# Patient Record
Sex: FEMALE | Race: WHITE
[De-identification: names, ages, dates, MRNs, and addresses within clinical notes are randomized per-mention and may not be internally consistent; named-entity substitution may affect disease eponyms.]

---

## 2023-01-30 ENCOUNTER — OFFICE VISIT (OUTPATIENT)
Dept: FAMILY MEDICINE CLINIC | Facility: CLINIC | Age: 43
End: 2023-01-30

## 2023-01-30 VITALS
HEART RATE: 63 BPM | HEIGHT: 62 IN | BODY MASS INDEX: 27.79 KG/M2 | SYSTOLIC BLOOD PRESSURE: 102 MMHG | DIASTOLIC BLOOD PRESSURE: 64 MMHG | OXYGEN SATURATION: 98 % | WEIGHT: 151 LBS

## 2023-01-30 DIAGNOSIS — Z11.3 SCREENING EXAMINATION FOR STD (SEXUALLY TRANSMITTED DISEASE): Primary | ICD-10-CM

## 2023-01-30 DIAGNOSIS — F32.A ANXIETY AND DEPRESSION: ICD-10-CM

## 2023-01-30 DIAGNOSIS — Z12.4 PAP SMEAR FOR CERVICAL CANCER SCREENING: ICD-10-CM

## 2023-01-30 DIAGNOSIS — K59.00 CONSTIPATION, UNSPECIFIED CONSTIPATION TYPE: ICD-10-CM

## 2023-01-30 DIAGNOSIS — F43.10 POST TRAUMATIC STRESS DISORDER (PTSD): ICD-10-CM

## 2023-01-30 DIAGNOSIS — F51.04 PSYCHOPHYSIOLOGICAL INSOMNIA: ICD-10-CM

## 2023-01-30 DIAGNOSIS — Z00.00 ENCNTR FOR GENERAL ADULT MEDICAL EXAM W/O ABNORMAL FINDINGS: ICD-10-CM

## 2023-01-30 DIAGNOSIS — F41.9 ANXIETY AND DEPRESSION: ICD-10-CM

## 2023-01-30 DIAGNOSIS — Z11.59 NEED FOR HEPATITIS C SCREENING TEST: ICD-10-CM

## 2023-01-30 LAB
ALBUMIN SERPL-MCNC: 3.8 G/DL (ref 3.5–5)
ALBUMIN/GLOB SERPL: 1 (ref 0.4–1.6)
ALP SERPL-CCNC: 51 U/L (ref 50–136)
ALT SERPL-CCNC: 20 U/L (ref 12–65)
ANION GAP SERPL CALC-SCNC: 9 MMOL/L (ref 2–11)
AST SERPL-CCNC: 10 U/L (ref 15–37)
BASOPHILS # BLD: 0 K/UL (ref 0–0.2)
BASOPHILS NFR BLD: 0 % (ref 0–2)
BILIRUB SERPL-MCNC: 0.2 MG/DL (ref 0.2–1.1)
BUN SERPL-MCNC: 14 MG/DL (ref 6–23)
CALCIUM SERPL-MCNC: 9.6 MG/DL (ref 8.3–10.4)
CHLORIDE SERPL-SCNC: 105 MMOL/L (ref 101–110)
CHOLEST SERPL-MCNC: 187 MG/DL
CO2 SERPL-SCNC: 26 MMOL/L (ref 21–32)
CREAT SERPL-MCNC: 0.8 MG/DL (ref 0.6–1)
DIFFERENTIAL METHOD BLD: NORMAL
EOSINOPHIL # BLD: 0.1 K/UL (ref 0–0.8)
EOSINOPHIL NFR BLD: 1 % (ref 0.5–7.8)
ERYTHROCYTE [DISTWIDTH] IN BLOOD BY AUTOMATED COUNT: 12.1 % (ref 11.9–14.6)
GLOBULIN SER CALC-MCNC: 4 G/DL (ref 2.8–4.5)
GLUCOSE SERPL-MCNC: 78 MG/DL (ref 65–100)
HCT VFR BLD AUTO: 40.8 % (ref 35.8–46.3)
HCV AB SER QL: NONREACTIVE
HDLC SERPL-MCNC: 63 MG/DL (ref 40–60)
HDLC SERPL: 3
HGB BLD-MCNC: 13 G/DL (ref 11.7–15.4)
HIV 1+2 AB+HIV1 P24 AG SERPL QL IA: NONREACTIVE
HIV 1/2 RESULT COMMENT: NORMAL
IMM GRANULOCYTES # BLD AUTO: 0 K/UL (ref 0–0.5)
IMM GRANULOCYTES NFR BLD AUTO: 0 % (ref 0–5)
LDLC SERPL CALC-MCNC: 102 MG/DL
LYMPHOCYTES # BLD: 2.8 K/UL (ref 0.5–4.6)
LYMPHOCYTES NFR BLD: 36 % (ref 13–44)
MCH RBC QN AUTO: 31.2 PG (ref 26.1–32.9)
MCHC RBC AUTO-ENTMCNC: 31.9 G/DL (ref 31.4–35)
MCV RBC AUTO: 97.8 FL (ref 82–102)
MONOCYTES # BLD: 0.6 K/UL (ref 0.1–1.3)
MONOCYTES NFR BLD: 8 % (ref 4–12)
NEUTS SEG # BLD: 4.2 K/UL (ref 1.7–8.2)
NEUTS SEG NFR BLD: 55 % (ref 43–78)
NRBC # BLD: 0 K/UL (ref 0–0.2)
PLATELET # BLD AUTO: 277 K/UL (ref 150–450)
PMV BLD AUTO: 11.6 FL (ref 9.4–12.3)
POTASSIUM SERPL-SCNC: 4.9 MMOL/L (ref 3.5–5.1)
PROT SERPL-MCNC: 7.8 G/DL (ref 6.3–8.2)
RBC # BLD AUTO: 4.17 M/UL (ref 4.05–5.2)
SODIUM SERPL-SCNC: 140 MMOL/L (ref 133–143)
TRIGL SERPL-MCNC: 110 MG/DL (ref 35–150)
TSH W FREE THYROID IF ABNORMAL: 2.59 UIU/ML (ref 0.36–3.74)
VLDLC SERPL CALC-MCNC: 22 MG/DL (ref 6–23)
WBC # BLD AUTO: 7.7 K/UL (ref 4.3–11.1)

## 2023-01-30 PROCEDURE — 99386 PREV VISIT NEW AGE 40-64: CPT | Performed by: NURSE PRACTITIONER

## 2023-01-30 RX ORDER — TRAZODONE HYDROCHLORIDE 50 MG/1
50 TABLET ORAL NIGHTLY
Qty: 90 TABLET | Refills: 1 | Status: SHIPPED | OUTPATIENT
Start: 2023-01-30 | End: 2023-02-02

## 2023-01-30 SDOH — ECONOMIC STABILITY: FOOD INSECURITY: WITHIN THE PAST 12 MONTHS, THE FOOD YOU BOUGHT JUST DIDN'T LAST AND YOU DIDN'T HAVE MONEY TO GET MORE.: NEVER TRUE

## 2023-01-30 SDOH — ECONOMIC STABILITY: TRANSPORTATION INSECURITY
IN THE PAST 12 MONTHS, HAS THE LACK OF TRANSPORTATION KEPT YOU FROM MEDICAL APPOINTMENTS OR FROM GETTING MEDICATIONS?: NO

## 2023-01-30 SDOH — ECONOMIC STABILITY: FOOD INSECURITY: WITHIN THE PAST 12 MONTHS, YOU WORRIED THAT YOUR FOOD WOULD RUN OUT BEFORE YOU GOT MONEY TO BUY MORE.: NEVER TRUE

## 2023-01-30 SDOH — ECONOMIC STABILITY: TRANSPORTATION INSECURITY
IN THE PAST 12 MONTHS, HAS LACK OF TRANSPORTATION KEPT YOU FROM MEETINGS, WORK, OR FROM GETTING THINGS NEEDED FOR DAILY LIVING?: NO

## 2023-01-30 ASSESSMENT — ENCOUNTER SYMPTOMS
NAUSEA: 0
RECTAL PAIN: 1
VOMITING: 0
RESPIRATORY NEGATIVE: 1
ANAL BLEEDING: 0
ALLERGIC/IMMUNOLOGIC COMMENTS: POLLEN
CONSTIPATION: 1
ABDOMINAL PAIN: 0
BLOOD IN STOOL: 0
DIARRHEA: 0

## 2023-01-30 ASSESSMENT — PATIENT HEALTH QUESTIONNAIRE - PHQ9
2. FEELING DOWN, DEPRESSED OR HOPELESS: 3
7. TROUBLE CONCENTRATING ON THINGS, SUCH AS READING THE NEWSPAPER OR WATCHING TELEVISION: 0
8. MOVING OR SPEAKING SO SLOWLY THAT OTHER PEOPLE COULD HAVE NOTICED. OR THE OPPOSITE, BEING SO FIGETY OR RESTLESS THAT YOU HAVE BEEN MOVING AROUND A LOT MORE THAN USUAL: 0
10. IF YOU CHECKED OFF ANY PROBLEMS, HOW DIFFICULT HAVE THESE PROBLEMS MADE IT FOR YOU TO DO YOUR WORK, TAKE CARE OF THINGS AT HOME, OR GET ALONG WITH OTHER PEOPLE: 0
3. TROUBLE FALLING OR STAYING ASLEEP: 3
SUM OF ALL RESPONSES TO PHQ QUESTIONS 1-9: 12
SUM OF ALL RESPONSES TO PHQ QUESTIONS 1-9: 12
9. THOUGHTS THAT YOU WOULD BE BETTER OFF DEAD, OR OF HURTING YOURSELF: 0
SUM OF ALL RESPONSES TO PHQ QUESTIONS 1-9: 12
SUM OF ALL RESPONSES TO PHQ9 QUESTIONS 1 & 2: 3
1. LITTLE INTEREST OR PLEASURE IN DOING THINGS: 0
SUM OF ALL RESPONSES TO PHQ QUESTIONS 1-9: 12
6. FEELING BAD ABOUT YOURSELF - OR THAT YOU ARE A FAILURE OR HAVE LET YOURSELF OR YOUR FAMILY DOWN: 0
5. POOR APPETITE OR OVEREATING: 3
4. FEELING TIRED OR HAVING LITTLE ENERGY: 3

## 2023-01-30 ASSESSMENT — SOCIAL DETERMINANTS OF HEALTH (SDOH): HOW HARD IS IT FOR YOU TO PAY FOR THE VERY BASICS LIKE FOOD, HOUSING, MEDICAL CARE, AND HEATING?: NOT VERY HARD

## 2023-01-30 ASSESSMENT — LIFESTYLE VARIABLES
HOW OFTEN DO YOU HAVE A DRINK CONTAINING ALCOHOL: MONTHLY OR LESS
HOW MANY STANDARD DRINKS CONTAINING ALCOHOL DO YOU HAVE ON A TYPICAL DAY: 1 OR 2

## 2023-01-30 NOTE — PROGRESS NOTES
Cherelle Rizo is a 43 y.o. female who presents today for the following:  Chief Complaint   Patient presents with    New Patient     Establishing Care; [facility] patient         Allergies   Allergen Reactions    Lavender Oil Hives, Itching and Rash    Penicillins Diarrhea, Hives, Itching and Rash       Current Outpatient Medications   Medication Sig Dispense Refill    Melatonin 5 MG CAPS Take by mouth      traZODone (DESYREL) 50 MG tablet Take 1 tablet by mouth nightly 90 tablet 1     No current facility-administered medications for this visit. Past Medical History:   Diagnosis Date    ADHD (attention deficit hyperactivity disorder)     Anxiety     Depression     Hypothyroidism     PTSD (post-traumatic stress disorder)     Substance abuse ([facility] [address]        Past Surgical History:   Procedure Laterality Date    APPENDECTOMY      BREAST REDUCTION SURGERY Bilateral     CHOLECYSTECTOMY         Social History     Tobacco Use    Smoking status: Never     Passive exposure: Never    Smokeless tobacco: Never   Substance Use Topics    Alcohol use: Yes     Comment: drinks once every few months        Family History   Problem Relation Age of Onset    Mental Illness Mother     Mental Illness Father     Mental Illness Brother     Colon Cancer Maternal Grandmother         diagnosed 62s    No Known Problems Maternal Grandfather     Alzheimer's Disease Paternal Grandmother     No Known Problems Paternal Grandfather        HPI   Pt presents new to me and the practice to establish care and female gyn exam.    Review of Systems   Constitutional:  Positive for fatigue and unexpected weight change. Negative for chills and fever. Fatigue. Looking at clock 6 times a night. 4 months gained 12 pounds fluctuating. Concerned may be eating too much. HENT:  Positive for tinnitus. Chronic trauma left ear; high pitched. Eyes:  Positive for visual disturbance.         Left eye trauma (went to [facility] could not afford CT for hx of possible orbital fx). Blurred vision   Respiratory: Negative. Cardiovascular:  Positive for palpitations and leg swelling. Negative for chest pain. Ankle swelling at times. Gastrointestinal:  Positive for constipation and rectal pain. Negative for abdominal pain, anal bleeding, blood in stool, diarrhea, nausea and vomiting. Hemorrhoids. Constipation intolerable. Hemorrhoid last week. Endocrine: Positive for cold intolerance, polydipsia and polyphagia. Negative for heat intolerance. Hands to cold   Genitourinary:  Positive for frequency and menstrual problem. Negative for dysuria, flank pain, genital sores, hematuria, pelvic pain, urgency, vaginal bleeding, vaginal discharge and vaginal pain. Blood clots irratic 8 days to 3 days as far as volume. Regular each month. LMP just finished; LMP 01/25/23. Bladder incontinence 5 years. Moving worse. 3 pregnancies and one miscarriage. Musculoskeletal: Negative. Skin: Negative. Allergy to lavender; itching at night brief hives dissipates quickly. Possibly laundry detergent. Allergic/Immunologic: Positive for environmental allergies. Negative for food allergies. Pollen   Neurological:  Positive for dizziness and numbness. Negative for seizures, syncope, weakness and headaches. Postural at times lingers 10 seconds. Fingers neuropathy cream white and get cold. Hematological:  Negative for adenopathy. Bruises/bleeds easily. ADHD was on Adderral.     Psychiatric/Behavioral:  Positive for agitation, decreased concentration and dysphoric mood. Negative for self-injury, sleep disturbance and suicidal ideas. The patient is nervous/anxious. Melatonin helps to onset sleep. Cannot stay asleep. Trazodone in the past (50 mg-150 mg total), PTSD, SHAKEEL, Major Depression. Gained a lot of weight with Zoloft.       /64   Pulse 63   Ht 5' 2\" (1.575 m)   Wt 151 lb (68.5 kg)   SpO2 98%   BMI 27.62 kg/m²     Physical Exam  Exam conducted with a chaperone present Raj Daily LPN). Constitutional:       General: She is not in acute distress. Appearance: Normal appearance. She is normal weight. She is not ill-appearing. HENT:      Head: Normocephalic and atraumatic. Right Ear: External ear normal. There is impacted cerumen. Left Ear: External ear normal. There is impacted cerumen. Nose: Nose normal.      Mouth/Throat:      Mouth: Mucous membranes are moist.      Pharynx: Oropharynx is clear. Eyes:      Extraocular Movements: Extraocular movements intact. Conjunctiva/sclera: Conjunctivae normal.      Pupils: Pupils are equal, round, and reactive to light. Neck:      Vascular: No carotid bruit. Cardiovascular:      Rate and Rhythm: Normal rate and regular rhythm. Pulses: Normal pulses. Heart sounds: Normal heart sounds. Pulmonary:      Effort: Pulmonary effort is normal.      Breath sounds: Normal breath sounds. Chest:   Breasts:     Right: Normal.      Left: Normal.      Comments: Scarring under breasts from hx of surgery  Abdominal:      General: Bowel sounds are normal. There is no distension. Palpations: Abdomen is soft. Tenderness: There is no abdominal tenderness. Hernia: There is no hernia in the left inguinal area or right inguinal area. Genitourinary:     General: Normal vulva. Exam position: Lithotomy position. Pubic Area: No rash or pubic lice. Labia:         Right: No rash, tenderness, lesion or injury. Left: No rash, tenderness, lesion or injury. Urethra: No prolapse, urethral pain, urethral swelling or urethral lesion. Vagina: No signs of injury and foreign body. Vaginal discharge present. No erythema, tenderness, bleeding, lesions or prolapsed vaginal walls.       Cervix: Normal.      Uterus: Normal.       Adnexa: Right adnexa normal and left adnexa normal.      Rectum: External hemorrhoid present. Comments: Tiny hemorrhoid/tag external    Pt with some discomfort during exam; otherwise, tolerated well. Copious clear vaginal discharge. Musculoskeletal:         General: Normal range of motion. Cervical back: Normal range of motion and neck supple. No rigidity or tenderness. Right lower leg: No edema. Left lower leg: No edema. Lymphadenopathy:      Cervical: No cervical adenopathy. Upper Body:      Right upper body: No supraclavicular, axillary or pectoral adenopathy. Left upper body: No supraclavicular, axillary or pectoral adenopathy. Lower Body: No right inguinal adenopathy. No left inguinal adenopathy. Skin:     General: Skin is warm and dry. Coloration: Skin is not jaundiced or pale. Neurological:      General: No focal deficit present. Mental Status: She is alert and oriented to person, place, and time. Psychiatric:         Mood and Affect: Mood normal.         Behavior: Behavior normal.         Thought Content: Thought content normal.         Judgment: Judgment normal.        1. Screening examination for STD (sexually transmitted disease)  -     Nuswab Vaginitis (VG); Future  -     HIV 1/2 Ag/Ab, 4TH Generation,W Rflx Confirm; Future  -     RPR Reflex to Titer and TPPA; Future  2. Encntr for general adult medical exam w/o abnormal findings  -     Comprehensive Metabolic Panel; Future  -     CBC with Auto Differential; Future  -     Lipid Panel; Future  -     TSH with Reflex; Future  3. Need for hepatitis C screening test  -     Hepatitis C Antibody; Future  4. Pap smear for cervical cancer screening  -     PAP IG, Liquid-Based Rfx Aptima HPV when ASC-U, ASC-H, LSIL, HSIL, BRODERICK; Future  5. Psychophysiological insomnia  -     traZODone (DESYREL) 50 MG tablet; Take 1 tablet by mouth nightly, Disp-90 tablet, R-1Normal  6. Anxiety and depression  7. Constipation, unspecified constipation type  8.  Post traumatic stress disorder (PTSD) Follow up closely next week to discuss further regarding her anxiety and depression. She had been on sertraline last time and had significant weight gain. Doesn't want to take anything with significant weight gain profile. Patient informed, we will call with blood work results within one week. If you have not heard regarding results in over a week, please contact office. You can also review results on Moment. Follow-up and Dispositions    Return in about 1 week (around 2/6/2023) for Medication Evaluation/Print AVS, Medication Evaluation.          Giovani Iniguez, APRN - CNP

## 2023-01-30 NOTE — PATIENT INSTRUCTIONS
Try Miralax over the counter as needed for constipation. Increase water and fiber in diet. Debrox ear drops over the counter to help soften ear wax. May schedule to have cleaned next visit if desired.

## 2023-01-31 LAB
RPR SER QL: REACTIVE
RPR SER-TITR: NORMAL {TITER}

## 2023-01-31 NOTE — RESULT ENCOUNTER NOTE
Labs look good. Please let me know if pt has any additional questions. Keep scheduled appointment. We will call when her syphilis and vaginal swab result.

## 2023-02-01 ENCOUNTER — TELEPHONE (OUTPATIENT)
Dept: FAMILY MEDICINE CLINIC | Facility: CLINIC | Age: 43
End: 2023-02-01

## 2023-02-01 DIAGNOSIS — F51.04 PSYCHOPHYSIOLOGICAL INSOMNIA: ICD-10-CM

## 2023-02-01 DIAGNOSIS — Z79.899 MEDICATION MANAGEMENT: ICD-10-CM

## 2023-02-01 DIAGNOSIS — F41.9 ANXIETY AND DEPRESSION: Primary | ICD-10-CM

## 2023-02-01 DIAGNOSIS — F32.A ANXIETY AND DEPRESSION: Primary | ICD-10-CM

## 2023-02-01 LAB
A VAGINAE DNA VAG QL NAA+PROBE: ABNORMAL SCORE
BVAB2 DNA VAG QL NAA+PROBE: ABNORMAL SCORE
C ALBICANS DNA VAG QL NAA+PROBE: NEGATIVE
C GLABRATA DNA VAG QL NAA+PROBE: NEGATIVE
MEGA1 DNA VAG QL NAA+PROBE: ABNORMAL SCORE
SPECIMEN SOURCE: ABNORMAL
T PALLIDUM AB SER QL IA: REACTIVE
T VAGINALIS RRNA SPEC QL NAA+PROBE: NEGATIVE

## 2023-02-01 NOTE — TELEPHONE ENCOUNTER
Please enter a referral to John Li per patients request. She stated this was discussed at her office visit.  I have scheduled her an appointment with John Li 2/2/23 per [facility] and patients request. Thanks

## 2023-02-02 ENCOUNTER — TELEPHONE (OUTPATIENT)
Dept: FAMILY MEDICINE CLINIC | Facility: CLINIC | Age: 43
End: 2023-02-02

## 2023-02-02 ENCOUNTER — OFFICE VISIT (OUTPATIENT)
Dept: BEHAVIORAL/MENTAL HEALTH CLINIC | Age: 43
End: 2023-02-02

## 2023-02-02 VITALS
HEART RATE: 69 BPM | HEIGHT: 62 IN | OXYGEN SATURATION: 99 % | BODY MASS INDEX: 28.52 KG/M2 | TEMPERATURE: 98.3 F | DIASTOLIC BLOOD PRESSURE: 62 MMHG | WEIGHT: 155 LBS | SYSTOLIC BLOOD PRESSURE: 116 MMHG

## 2023-02-02 DIAGNOSIS — F43.12 NIGHTMARES ASSOCIATED WITH CHRONIC POST-TRAUMATIC STRESS DISORDER: ICD-10-CM

## 2023-02-02 DIAGNOSIS — F51.04 PSYCHOPHYSIOLOGICAL INSOMNIA: ICD-10-CM

## 2023-02-02 DIAGNOSIS — F41.9 ANXIETY AND DEPRESSION: ICD-10-CM

## 2023-02-02 DIAGNOSIS — F32.A ANXIETY AND DEPRESSION: ICD-10-CM

## 2023-02-02 DIAGNOSIS — F51.5 NIGHTMARES ASSOCIATED WITH CHRONIC POST-TRAUMATIC STRESS DISORDER: ICD-10-CM

## 2023-02-02 DIAGNOSIS — F43.10 POST TRAUMATIC STRESS DISORDER (PTSD): Primary | ICD-10-CM

## 2023-02-02 LAB
CYTOLOGIST CVX/VAG CYTO: NORMAL
CYTOLOGY CVX/VAG DOC THIN PREP: NORMAL
HPV REFLEX: NORMAL
Lab: NORMAL
PATH REPORT.FINAL DX SPEC: NORMAL
STAT OF ADQ CVX/VAG CYTO-IMP: NORMAL

## 2023-02-02 RX ORDER — TRAZODONE HYDROCHLORIDE 100 MG/1
100 TABLET ORAL NIGHTLY
Qty: 30 TABLET | Refills: 3 | Status: SHIPPED | OUTPATIENT
Start: 2023-02-02 | End: 2023-03-04

## 2023-02-02 RX ORDER — FLUOXETINE HYDROCHLORIDE 20 MG/1
20 CAPSULE ORAL EVERY MORNING
Qty: 30 CAPSULE | Refills: 3 | Status: SHIPPED | OUTPATIENT
Start: 2023-02-02 | End: 2023-03-04

## 2023-02-02 RX ORDER — PRAZOSIN HYDROCHLORIDE 1 MG/1
1 CAPSULE ORAL NIGHTLY
Qty: 30 CAPSULE | Refills: 3 | Status: SHIPPED | OUTPATIENT
Start: 2023-02-02 | End: 2023-03-04

## 2023-02-02 ASSESSMENT — PATIENT HEALTH QUESTIONNAIRE - PHQ9
2. FEELING DOWN, DEPRESSED OR HOPELESS: 1
SUM OF ALL RESPONSES TO PHQ QUESTIONS 1-9: 2
1. LITTLE INTEREST OR PLEASURE IN DOING THINGS: 1
SUM OF ALL RESPONSES TO PHQ9 QUESTIONS 1 & 2: 2
SUM OF ALL RESPONSES TO PHQ QUESTIONS 1-9: 2

## 2023-02-02 NOTE — TELEPHONE ENCOUNTER
Notified Abraham Henriquez,  pt labs showed syphilis + and recommend she go to the Health Department for Treatment. Abraham Henriquez will coordinate with pt and call the health department to schedule an appt.

## 2023-02-02 NOTE — RESULT ENCOUNTER NOTE
Pap looked good. Labs look good. Please let me know if pt has any additional questions. Keep scheduled appointment.
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
## 2023-02-06 ENCOUNTER — OFFICE VISIT (OUTPATIENT)
Dept: FAMILY MEDICINE CLINIC | Facility: CLINIC | Age: 43
End: 2023-02-06

## 2023-02-06 VITALS
DIASTOLIC BLOOD PRESSURE: 64 MMHG | TEMPERATURE: 98.4 F | HEIGHT: 62 IN | SYSTOLIC BLOOD PRESSURE: 106 MMHG | BODY MASS INDEX: 28.71 KG/M2 | HEART RATE: 88 BPM | OXYGEN SATURATION: 97 % | WEIGHT: 156 LBS

## 2023-02-06 DIAGNOSIS — H61.20 IMPACTED CERUMEN, UNSPECIFIED LATERALITY: ICD-10-CM

## 2023-02-06 DIAGNOSIS — F51.5 NIGHTMARES ASSOCIATED WITH CHRONIC POST-TRAUMATIC STRESS DISORDER: Primary | ICD-10-CM

## 2023-02-06 DIAGNOSIS — F51.04 PSYCHOPHYSIOLOGICAL INSOMNIA: ICD-10-CM

## 2023-02-06 DIAGNOSIS — F43.12 NIGHTMARES ASSOCIATED WITH CHRONIC POST-TRAUMATIC STRESS DISORDER: Primary | ICD-10-CM

## 2023-02-06 DIAGNOSIS — F43.10 POST TRAUMATIC STRESS DISORDER (PTSD): ICD-10-CM

## 2023-02-06 DIAGNOSIS — F32.A ANXIETY AND DEPRESSION: ICD-10-CM

## 2023-02-06 DIAGNOSIS — A53.9 SYPHILIS: ICD-10-CM

## 2023-02-06 DIAGNOSIS — F41.9 ANXIETY AND DEPRESSION: ICD-10-CM

## 2023-02-06 PROCEDURE — 99213 OFFICE O/P EST LOW 20 MIN: CPT | Performed by: NURSE PRACTITIONER

## 2023-02-06 SDOH — ECONOMIC STABILITY: INCOME INSECURITY: HOW HARD IS IT FOR YOU TO PAY FOR THE VERY BASICS LIKE FOOD, HOUSING, MEDICAL CARE, AND HEATING?: NOT HARD AT ALL

## 2023-02-06 SDOH — ECONOMIC STABILITY: HOUSING INSECURITY
IN THE LAST 12 MONTHS, WAS THERE A TIME WHEN YOU DID NOT HAVE A STEADY PLACE TO SLEEP OR SLEPT IN A SHELTER (INCLUDING NOW)?: NO

## 2023-02-06 SDOH — ECONOMIC STABILITY: FOOD INSECURITY: WITHIN THE PAST 12 MONTHS, YOU WORRIED THAT YOUR FOOD WOULD RUN OUT BEFORE YOU GOT MONEY TO BUY MORE.: NEVER TRUE

## 2023-02-06 SDOH — ECONOMIC STABILITY: FOOD INSECURITY: WITHIN THE PAST 12 MONTHS, THE FOOD YOU BOUGHT JUST DIDN'T LAST AND YOU DIDN'T HAVE MONEY TO GET MORE.: NEVER TRUE

## 2023-02-06 ASSESSMENT — PATIENT HEALTH QUESTIONNAIRE - PHQ9
5. POOR APPETITE OR OVEREATING: 0
8. MOVING OR SPEAKING SO SLOWLY THAT OTHER PEOPLE COULD HAVE NOTICED. OR THE OPPOSITE, BEING SO FIGETY OR RESTLESS THAT YOU HAVE BEEN MOVING AROUND A LOT MORE THAN USUAL: 0
10. IF YOU CHECKED OFF ANY PROBLEMS, HOW DIFFICULT HAVE THESE PROBLEMS MADE IT FOR YOU TO DO YOUR WORK, TAKE CARE OF THINGS AT HOME, OR GET ALONG WITH OTHER PEOPLE: 0
SUM OF ALL RESPONSES TO PHQ9 QUESTIONS 1 & 2: 0
2. FEELING DOWN, DEPRESSED OR HOPELESS: 0
6. FEELING BAD ABOUT YOURSELF - OR THAT YOU ARE A FAILURE OR HAVE LET YOURSELF OR YOUR FAMILY DOWN: 0
4. FEELING TIRED OR HAVING LITTLE ENERGY: 2
SUM OF ALL RESPONSES TO PHQ QUESTIONS 1-9: 4
1. LITTLE INTEREST OR PLEASURE IN DOING THINGS: 0
SUM OF ALL RESPONSES TO PHQ QUESTIONS 1-9: 4
7. TROUBLE CONCENTRATING ON THINGS, SUCH AS READING THE NEWSPAPER OR WATCHING TELEVISION: 0
3. TROUBLE FALLING OR STAYING ASLEEP: 2
SUM OF ALL RESPONSES TO PHQ QUESTIONS 1-9: 4
SUM OF ALL RESPONSES TO PHQ QUESTIONS 1-9: 4
9. THOUGHTS THAT YOU WOULD BE BETTER OFF DEAD, OR OF HURTING YOURSELF: 0

## 2023-02-06 ASSESSMENT — ENCOUNTER SYMPTOMS: RESPIRATORY NEGATIVE: 1

## 2023-02-06 NOTE — PROGRESS NOTES
Junior Gonzalez is a 43 y.o. female who presents today for the following:  Chief Complaint   Patient presents with    Follow-up     Medication follow up; requesting RX for ear drops          Allergies   Allergen Reactions    Lavender Oil Hives, Itching and Rash    Penicillins Diarrhea, Hives, Itching and Rash       Current Outpatient Medications   Medication Sig Dispense Refill    carbamide peroxide (DEBROX) 6.5 % otic solution Place 5 drops into both ears as needed (cerumen impaction) 15 mL 0    traZODone (DESYREL) 100 MG tablet Take 1 tablet by mouth nightly 30 tablet 3    FLUoxetine (PROZAC) 20 MG capsule Take 1 capsule by mouth every morning 30 capsule 3    prazosin (MINIPRESS) 1 MG capsule Take 1 capsule by mouth nightly 30 capsule 3    Melatonin 5 MG CAPS Take by mouth (Patient not taking: Reported on 2/6/2023)       No current facility-administered medications for this visit. Past Medical History:   Diagnosis Date    ADHD (attention deficit hyperactivity disorder)     Anxiety     Depression     Hypothyroidism     PTSD (post-traumatic stress disorder)     Substance abuse ([facility] [address]        Past Surgical History:   Procedure Laterality Date    APPENDECTOMY      BREAST REDUCTION SURGERY Bilateral     CHOLECYSTECTOMY         Social History     Tobacco Use    Smoking status: Never     Passive exposure: Never    Smokeless tobacco: Never   Substance Use Topics    Alcohol use: Yes     Comment: drinks once every few months        Family History   Problem Relation Age of Onset    Mental Illness Mother     Mental Illness Father     Mental Illness Brother     Colon Cancer Maternal Grandmother         diagnosed 62s    No Known Problems Maternal Grandfather     Alzheimer's Disease Paternal Grandmother     No Known Problems Paternal Grandfather        HPI   Pt presents for follow up insomnia, anxiety, PTSD, and depression. Hx. PTSD with nightmares, anxiety, depression, and insomnia.   She is currently in the [facility] program.  Started Prozac 20 mg and prazosin per Lester Mccauley. Also, trazodone was increased to 100 mg. Pt had positive syphilis labs and has been referred to the Health Department. Review of Systems   Constitutional: Negative. Respiratory: Negative. Cardiovascular: Negative. Skin: Negative. Psychiatric/Behavioral: Negative. /64   Pulse 88   Temp 98.4 °F (36.9 °C) (Oral)   Ht 5' 2\" (1.575 m)   Wt 156 lb (70.8 kg)   SpO2 97%   BMI 28.53 kg/m²     Physical Exam  Constitutional:       General: She is not in acute distress. Appearance: Normal appearance. She is not ill-appearing. HENT:      Head: Normocephalic and atraumatic. Right Ear: External ear normal.      Left Ear: External ear normal.   Eyes:      Extraocular Movements: Extraocular movements intact. Conjunctiva/sclera: Conjunctivae normal.      Pupils: Pupils are equal, round, and reactive to light. Cardiovascular:      Rate and Rhythm: Normal rate and regular rhythm. Pulses: Normal pulses. Heart sounds: Normal heart sounds. Pulmonary:      Effort: Pulmonary effort is normal.      Breath sounds: Normal breath sounds. Abdominal:      General: There is no distension. Musculoskeletal:         General: Normal range of motion. Cervical back: Normal range of motion and neck supple. Right lower leg: No edema. Left lower leg: No edema. Skin:     General: Skin is warm and dry. Coloration: Skin is not jaundiced or pale. Neurological:      General: No focal deficit present. Mental Status: She is alert and oriented to person, place, and time. Psychiatric:         Mood and Affect: Mood normal.         Behavior: Behavior normal.         Thought Content: Thought content normal.         Judgment: Judgment normal.        1. Nightmares associated with chronic post-traumatic stress disorder  2. Anxiety and depression  3. Psychophysiological insomnia  4.  Post traumatic stress disorder (PTSD)  5. Syphilis  6. Impacted cerumen, unspecified laterality  -     carbamide peroxide (DEBROX) 6.5 % otic solution; Place 5 drops into both ears as needed (cerumen impaction), Disp-15 mL, R-0Normal   Pt given information regarding high fiber diet and ways to lower cholesterol as well as information regarding syphilis. Patient informed, we will call with blood work results within one week. If you have not heard regarding results in over a week, please contact office. You can also review results on [employer]. Follow-up and Dispositions    Return in about 6 months (around 8/6/2023).          Cali Bose, APRN - CNP

## 2023-02-14 ENCOUNTER — TELEPHONE (OUTPATIENT)
Dept: FAMILY MEDICINE CLINIC | Facility: CLINIC | Age: 43
End: 2023-02-14

## 2023-02-14 NOTE — TELEPHONE ENCOUNTER
Received call from [facility], stating that they received notification that patient tested positive for syphilis. [address] [place] [address] needing to know if patient received treatment by our office or if she needs to be brought in to the health department for treatment. She can be reached back at [phone].

## 2023-02-15 NOTE — TELEPHONE ENCOUNTER
Called Papo back at [phone] but call went to voicemail. Sounded like a personal number. Reached out to the [facility] Katharine at [phone], option 5. No answer, left message, requesting a return call.

## 2023-02-28 ENCOUNTER — PATIENT MESSAGE (OUTPATIENT)
Dept: BEHAVIORAL/MENTAL HEALTH CLINIC | Age: 43
End: 2023-02-28

## 2023-02-28 ENCOUNTER — PATIENT MESSAGE (OUTPATIENT)
Dept: FAMILY MEDICINE CLINIC | Facility: CLINIC | Age: 43
End: 2023-02-28

## 2023-02-28 DIAGNOSIS — F43.12 NIGHTMARES ASSOCIATED WITH CHRONIC POST-TRAUMATIC STRESS DISORDER: ICD-10-CM

## 2023-02-28 DIAGNOSIS — F51.04 PSYCHOPHYSIOLOGICAL INSOMNIA: ICD-10-CM

## 2023-02-28 DIAGNOSIS — F51.5 NIGHTMARES ASSOCIATED WITH CHRONIC POST-TRAUMATIC STRESS DISORDER: ICD-10-CM

## 2023-02-28 RX ORDER — FLUOXETINE HYDROCHLORIDE 20 MG/1
20 CAPSULE ORAL EVERY MORNING
Qty: 30 CAPSULE | Refills: 3 | Status: SHIPPED | OUTPATIENT
Start: 2023-02-28 | End: 2023-03-30

## 2023-02-28 RX ORDER — TRAZODONE HYDROCHLORIDE 100 MG/1
100 TABLET ORAL NIGHTLY
Qty: 30 TABLET | Refills: 3 | Status: SHIPPED | OUTPATIENT
Start: 2023-02-28 | End: 2023-03-30

## 2023-02-28 RX ORDER — PRAZOSIN HYDROCHLORIDE 1 MG/1
1 CAPSULE ORAL NIGHTLY
Qty: 30 CAPSULE | Refills: 3 | Status: SHIPPED | OUTPATIENT
Start: 2023-02-28 | End: 2023-03-30

## 2023-02-28 NOTE — TELEPHONE ENCOUNTER
From: Sumi Art  To: Kathie Plasencia  Sent: 2/28/2023 10:24 AM EST  Subject: Request prescriptions be moved to [facility]. [facility] Could you please call in my prescriptions to that pharmacy? Thank you!     Sumi Art

## 2023-02-28 NOTE — TELEPHONE ENCOUNTER
From: Aissatou Collins  To: Manuelito Velasquez  Sent: 2/28/2023 10:23 AM EST  Subject: Request prescriptions to be moved to [facility]. [facility] Could you please call in my prescriptions to that pharmacy? Thank you very much.

## 2023-03-15 ENCOUNTER — OFFICE VISIT (OUTPATIENT)
Dept: BEHAVIORAL/MENTAL HEALTH CLINIC | Age: 43
End: 2023-03-15

## 2023-03-15 VITALS
WEIGHT: 156 LBS | SYSTOLIC BLOOD PRESSURE: 114 MMHG | BODY MASS INDEX: 28.71 KG/M2 | DIASTOLIC BLOOD PRESSURE: 66 MMHG | HEART RATE: 64 BPM | OXYGEN SATURATION: 98 % | HEIGHT: 62 IN

## 2023-03-15 DIAGNOSIS — F43.12 NIGHTMARES ASSOCIATED WITH CHRONIC POST-TRAUMATIC STRESS DISORDER: ICD-10-CM

## 2023-03-15 DIAGNOSIS — F43.10 POST TRAUMATIC STRESS DISORDER (PTSD): Primary | ICD-10-CM

## 2023-03-15 DIAGNOSIS — F41.9 ANXIETY AND DEPRESSION: ICD-10-CM

## 2023-03-15 DIAGNOSIS — F51.5 NIGHTMARES ASSOCIATED WITH CHRONIC POST-TRAUMATIC STRESS DISORDER: ICD-10-CM

## 2023-03-15 DIAGNOSIS — F51.04 PSYCHOPHYSIOLOGICAL INSOMNIA: ICD-10-CM

## 2023-03-15 DIAGNOSIS — F32.A ANXIETY AND DEPRESSION: ICD-10-CM

## 2023-03-15 PROCEDURE — 99215 OFFICE O/P EST HI 40 MIN: CPT | Performed by: NURSE PRACTITIONER

## 2023-03-15 RX ORDER — PRAZOSIN HYDROCHLORIDE 2 MG/1
2 CAPSULE ORAL NIGHTLY
Qty: 90 CAPSULE | Refills: 1 | Status: SHIPPED | OUTPATIENT
Start: 2023-03-15 | End: 2023-06-13

## 2023-03-15 RX ORDER — TRAZODONE HYDROCHLORIDE 150 MG/1
150 TABLET ORAL NIGHTLY
Qty: 90 TABLET | Refills: 1 | Status: SHIPPED | OUTPATIENT
Start: 2023-03-15 | End: 2023-06-13

## 2023-03-15 RX ORDER — FLUOXETINE HYDROCHLORIDE 40 MG/1
40 CAPSULE ORAL EVERY MORNING
Qty: 90 CAPSULE | Refills: 1 | Status: SHIPPED | OUTPATIENT
Start: 2023-03-15 | End: 2023-06-13

## 2023-03-15 ASSESSMENT — PATIENT HEALTH QUESTIONNAIRE - PHQ9
3. TROUBLE FALLING OR STAYING ASLEEP: 3
SUM OF ALL RESPONSES TO PHQ QUESTIONS 1-9: 6
SUM OF ALL RESPONSES TO PHQ9 QUESTIONS 1 & 2: 3
SUM OF ALL RESPONSES TO PHQ QUESTIONS 1-9: 6
1. LITTLE INTEREST OR PLEASURE IN DOING THINGS: 0
2. FEELING DOWN, DEPRESSED OR HOPELESS: 3
5. POOR APPETITE OR OVEREATING: 0
4. FEELING TIRED OR HAVING LITTLE ENERGY: 0
10. IF YOU CHECKED OFF ANY PROBLEMS, HOW DIFFICULT HAVE THESE PROBLEMS MADE IT FOR YOU TO DO YOUR WORK, TAKE CARE OF THINGS AT HOME, OR GET ALONG WITH OTHER PEOPLE: 1
SUM OF ALL RESPONSES TO PHQ QUESTIONS 1-9: 6
6. FEELING BAD ABOUT YOURSELF - OR THAT YOU ARE A FAILURE OR HAVE LET YOURSELF OR YOUR FAMILY DOWN: 0
8. MOVING OR SPEAKING SO SLOWLY THAT OTHER PEOPLE COULD HAVE NOTICED. OR THE OPPOSITE, BEING SO FIGETY OR RESTLESS THAT YOU HAVE BEEN MOVING AROUND A LOT MORE THAN USUAL: 0
SUM OF ALL RESPONSES TO PHQ QUESTIONS 1-9: 6
9. THOUGHTS THAT YOU WOULD BE BETTER OFF DEAD, OR OF HURTING YOURSELF: 0
7. TROUBLE CONCENTRATING ON THINGS, SUCH AS READING THE NEWSPAPER OR WATCHING TELEVISION: 0

## 2023-03-15 ASSESSMENT — ANXIETY QUESTIONNAIRES
3. WORRYING TOO MUCH ABOUT DIFFERENT THINGS: 0
5. BEING SO RESTLESS THAT IT IS HARD TO SIT STILL: 0
2. NOT BEING ABLE TO STOP OR CONTROL WORRYING: 0
IF YOU CHECKED OFF ANY PROBLEMS ON THIS QUESTIONNAIRE, HOW DIFFICULT HAVE THESE PROBLEMS MADE IT FOR YOU TO DO YOUR WORK, TAKE CARE OF THINGS AT HOME, OR GET ALONG WITH OTHER PEOPLE: SOMEWHAT DIFFICULT
1. FEELING NERVOUS, ANXIOUS, OR ON EDGE: 3
4. TROUBLE RELAXING: 2
7. FEELING AFRAID AS IF SOMETHING AWFUL MIGHT HAPPEN: 0
GAD7 TOTAL SCORE: 5
6. BECOMING EASILY ANNOYED OR IRRITABLE: 0

## 2023-03-15 NOTE — PROGRESS NOTES
OUTPATIENT PSYCHIATRIC RETURN VISIT PROGRESS NOTE    Date of Service: 3/15/2023     Identification    Zaynab Ang is a 37 y.o.  with a past psychiatric history of PTSD, anxiety, depression, nightmares, insomnia  who presents today for a psychiatric follow up appointment. CC:  Routine medication management follow up. Chief Complaint   Patient presents with    Follow-up     Routine follow up; still having nightmares        Subjective / Interval History:    Pt comes to clinic today and reports that fluoxetine 20 mg has been helpful but past week seem not as effective. Will increase to 40 mg daily. Nightmares have reemerged/worsened. Will increase Prazosin to 2 mg HS. Trazodone not as effective for sleep, will increase to 150 mg HS. Pt is doing well at [employer] She has been working hard at the [employer] at [employer] and is soon to graduate. Pt has been medication compliant and denies any side-effects. Pt denies SI, HI and AVH. Does not endorse any manic or psychotic symptoms. Psychiatric Review Of Systems:  Sleep: generally restful sleep but increased nightmares past week.   Appetite: ok  Current suicidal/homicidal ideations: Denies SI/ HI  Current auditory/visual hallucinations: Denies     Medications:    Current Outpatient Medications:     FLUoxetine (PROZAC) 20 MG capsule, Take 1 capsule by mouth every morning, Disp: 30 capsule, Rfl: 3    prazosin (MINIPRESS) 1 MG capsule, Take 1 capsule by mouth nightly, Disp: 30 capsule, Rfl: 3    traZODone (DESYREL) 100 MG tablet, Take 1 tablet by mouth nightly, Disp: 30 tablet, Rfl: 3       PMH:  Past Medical History:   Diagnosis Date    ADHD (attention deficit hyperactivity disorder)     Anxiety     Depression     Hypothyroidism     PTSD (post-traumatic stress disorder)     Substance abuse ([facility] [address]        Vitals:  Vitals:    03/15/23 0811   BP: 114/66   Pulse: 64   SpO2: 98%       ROS:  Psychological ROS: positive for anxiety, sleep disturbance, and PTSD nightmares    Mental Status Exam:   Appearance  Appears stated age, Well-kept, Appropriately attired, Cooperative, and Maintains eye-contact  Behavior  Anxious, Cooperative, and Pleasant  Psychomotor Activity within normal limits  Gait and Station Normal Tere and Station and Normal Balance  Speech   appropriate  Mood    is euthymic  Affect    calm  Thought Process Goal-Directed and Circumstantial  Thought Content/Perceptual Disturbances free of delusions, free of hallucinations, and not internally preoccupied  Cognition/Sensorium Alert, Fully oriented, Normal concentration/ Attention, Recent memory intact, Remote memory intact, and Fund of knowledge adequate for level of education  Insight  good  Judgment good  Assessment:    Navi Sampson was seen today for follow-up. Diagnoses and all orders for this visit:    Post traumatic stress disorder (PTSD)    Psychophysiological insomnia  -     traZODone (DESYREL) 150 MG tablet; Take 1 tablet by mouth nightly    Nightmares associated with chronic post-traumatic stress disorder  -     prazosin (MINIPRESS) 2 MG capsule; Take 1 capsule by mouth nightly    Anxiety and depression    Other orders  -     FLUoxetine (PROZAC) 40 MG capsule; Take 1 capsule by mouth every morning      Patient Education and Counseling:  Supportive therapy provided for identified psychosocial stressors. Medication education provided and decisions regarding medication regimen discussed with patient. Plan:  -  Increase fluoxetine to 40 mg daily, increase prazosin to 2 mg HS, increase Trazodone to 150 mg HS.  -  Crisis plan reviewed and patient verbally contracts for safety. Go to ED with emergent symptoms or safety concerns.  -  Risks, benefits, side effects of medications, including any / all black box warnings, discussed with patient, who verbalizes their understanding.     - [employer] web site checked for controlled substances.      Disposition:  home    Follow Up:  Return in 2 months, or call in the interim for any side-effects, decompensation, questions, or problems between now and the next visit. Return in about 2 months (around 5/15/2023) for Follow up. 48 minutes face to face with the patient with more than 50% of the total time spent on education, counseling, & coordination of care of the patient regarding ongoing psychiatric illness.         [address] [facility] [address]

## 2023-04-17 ENCOUNTER — HOSPITAL ENCOUNTER (OUTPATIENT)
Dept: MAMMOGRAPHY | Age: 43
Discharge: HOME | End: 2023-04-20

## 2023-04-17 DIAGNOSIS — Z12.31 SCREENING MAMMOGRAM FOR BREAST CANCER: ICD-10-CM

## 2023-04-17 PROCEDURE — 77067 SCR MAMMO BI INCL CAD: CPT

## 2023-05-01 ENCOUNTER — HOSPITAL ENCOUNTER (OUTPATIENT)
Dept: MAMMOGRAPHY | Age: 43
Discharge: HOME | End: 2023-05-04

## 2023-05-01 DIAGNOSIS — R92.8 ABNORMAL SCREENING MAMMOGRAM: ICD-10-CM

## 2023-05-01 PROCEDURE — 76642 ULTRASOUND BREAST LIMITED: CPT

## 2023-05-01 PROCEDURE — G0279 TOMOSYNTHESIS, MAMMO: HCPCS

## 2023-05-18 ENCOUNTER — OFFICE VISIT (OUTPATIENT)
Dept: BEHAVIORAL/MENTAL HEALTH CLINIC | Age: 43
End: 2023-05-18

## 2023-05-18 VITALS
SYSTOLIC BLOOD PRESSURE: 100 MMHG | OXYGEN SATURATION: 99 % | WEIGHT: 156 LBS | HEART RATE: 59 BPM | DIASTOLIC BLOOD PRESSURE: 62 MMHG | BODY MASS INDEX: 28.71 KG/M2 | HEIGHT: 62 IN

## 2023-05-18 DIAGNOSIS — F51.04 PSYCHOPHYSIOLOGICAL INSOMNIA: ICD-10-CM

## 2023-05-18 DIAGNOSIS — F43.12 NIGHTMARES ASSOCIATED WITH CHRONIC POST-TRAUMATIC STRESS DISORDER: ICD-10-CM

## 2023-05-18 DIAGNOSIS — F51.5 NIGHTMARES ASSOCIATED WITH CHRONIC POST-TRAUMATIC STRESS DISORDER: ICD-10-CM

## 2023-05-18 PROCEDURE — 99215 OFFICE O/P EST HI 40 MIN: CPT | Performed by: NURSE PRACTITIONER

## 2023-05-18 RX ORDER — FLUOXETINE HYDROCHLORIDE 40 MG/1
40 CAPSULE ORAL EVERY MORNING
Qty: 90 CAPSULE | Refills: 1 | Status: SHIPPED | OUTPATIENT
Start: 2023-05-18 | End: 2023-08-16

## 2023-05-18 RX ORDER — TRAZODONE HYDROCHLORIDE 100 MG/1
100 TABLET ORAL NIGHTLY
Qty: 90 TABLET | Refills: 1 | Status: SHIPPED | OUTPATIENT
Start: 2023-05-18 | End: 2023-08-16

## 2023-05-18 RX ORDER — PRAZOSIN HYDROCHLORIDE 2 MG/1
2 CAPSULE ORAL NIGHTLY
Qty: 90 CAPSULE | Refills: 1 | Status: SHIPPED | OUTPATIENT
Start: 2023-05-18 | End: 2023-08-16

## 2023-05-18 RX ORDER — ARIPIPRAZOLE 5 MG/1
5 TABLET ORAL EVERY MORNING
Qty: 90 TABLET | Refills: 1 | Status: SHIPPED | OUTPATIENT
Start: 2023-05-18 | End: 2023-08-16

## 2023-05-18 ASSESSMENT — PATIENT HEALTH QUESTIONNAIRE - PHQ9
SUM OF ALL RESPONSES TO PHQ QUESTIONS 1-9: 1
SUM OF ALL RESPONSES TO PHQ9 QUESTIONS 1 & 2: 1
2. FEELING DOWN, DEPRESSED OR HOPELESS: 1
1. LITTLE INTEREST OR PLEASURE IN DOING THINGS: 0
SUM OF ALL RESPONSES TO PHQ QUESTIONS 1-9: 1

## 2023-05-18 NOTE — PROGRESS NOTES
OUTPATIENT PSYCHIATRIC RETURN VISIT PROGRESS NOTE    Date of Service: 5/18/2023     Identification    Emi Palacio is a 37 y.o.  with a past psychiatric history of PTSD,anxiety, depression, nightmares, insomnia  who presents today for a psychiatric follow up appointment. CC:  Routine medication management follow up. Chief Complaint   Patient presents with    Follow-up        Subjective / Interval History:    Pt comes to clinic today and reports that has been at [facility] for 4 months now. She has graduated    from the [employer] at [facility] [employer] and is now working at [employer]. She recently had an opportunity to have a good visit with her daughter and 20 month old grandson. She has had some challenges living at [facility], especially with several other residents who have now left the program, but she see them as having been opportunities for growth. The Prazosin 2 mg has been effective for controlling  nightmares . Trazodone 150 mg now a bit too sedating in AM so will be decreased to 100 mg HS. Mood remains stable overall, though says fluoxetine not fully effective and irritable at times. Will add Abilify 5 mg Q AM.  Pt has been medication compliant and denies any side-effects. Pt denies SI, HI and AVH. Does not endorse any manic or psychotic symptoms.     Psychiatric Review Of Systems:  Sleep: generally restful sleep  Appetite: ok  Current suicidal/homicidal ideations: Denies SI/ HI  Current auditory/visual hallucinations: Denies     Medications:    Current Outpatient Medications:     prazosin (MINIPRESS) 2 MG capsule, Take 1 capsule by mouth nightly, Disp: 90 capsule, Rfl: 1    FLUoxetine (PROZAC) 40 MG capsule, Take 1 capsule by mouth every morning, Disp: 90 capsule, Rfl: 1    traZODone (DESYREL) 150 MG tablet, Take 1 tablet by mouth nightly, Disp: 90 tablet, Rfl: 1       PMH:  Past Medical History:   Diagnosis Date    ADHD (attention deficit hyperactivity disorder)     Anxiety

## 2023-05-26 ENCOUNTER — TELEPHONE (OUTPATIENT)
Dept: FAMILY MEDICINE CLINIC | Facility: CLINIC | Age: 43
End: 2023-05-26

## 2023-08-06 ASSESSMENT — PATIENT HEALTH QUESTIONNAIRE - PHQ9
6. FEELING BAD ABOUT YOURSELF - OR THAT YOU ARE A FAILURE OR HAVE LET YOURSELF OR YOUR FAMILY DOWN: NOT AT ALL
2. FEELING DOWN, DEPRESSED OR HOPELESS: 1
SUM OF ALL RESPONSES TO PHQ QUESTIONS 1-9: 7
3. TROUBLE FALLING OR STAYING ASLEEP: NOT AT ALL
10. IF YOU CHECKED OFF ANY PROBLEMS, HOW DIFFICULT HAVE THESE PROBLEMS MADE IT FOR YOU TO DO YOUR WORK, TAKE CARE OF THINGS AT HOME, OR GET ALONG WITH OTHER PEOPLE: SOMEWHAT DIFFICULT
7. TROUBLE CONCENTRATING ON THINGS, SUCH AS READING THE NEWSPAPER OR WATCHING TELEVISION: 2
2. FEELING DOWN, DEPRESSED OR HOPELESS: SEVERAL DAYS
9. THOUGHTS THAT YOU WOULD BE BETTER OFF DEAD, OR OF HURTING YOURSELF: NOT AT ALL
6. FEELING BAD ABOUT YOURSELF - OR THAT YOU ARE A FAILURE OR HAVE LET YOURSELF OR YOUR FAMILY DOWN: 0
10. IF YOU CHECKED OFF ANY PROBLEMS, HOW DIFFICULT HAVE THESE PROBLEMS MADE IT FOR YOU TO DO YOUR WORK, TAKE CARE OF THINGS AT HOME, OR GET ALONG WITH OTHER PEOPLE: 1
4. FEELING TIRED OR HAVING LITTLE ENERGY: 1
9. THOUGHTS THAT YOU WOULD BE BETTER OFF DEAD, OR OF HURTING YOURSELF: 0
4. FEELING TIRED OR HAVING LITTLE ENERGY: SEVERAL DAYS
8. MOVING OR SPEAKING SO SLOWLY THAT OTHER PEOPLE COULD HAVE NOTICED. OR THE OPPOSITE, BEING SO FIGETY OR RESTLESS THAT YOU HAVE BEEN MOVING AROUND A LOT MORE THAN USUAL: 0
5. POOR APPETITE OR OVEREATING: NEARLY EVERY DAY
SUM OF ALL RESPONSES TO PHQ QUESTIONS 1-9: 7
1. LITTLE INTEREST OR PLEASURE IN DOING THINGS: NOT AT ALL
SUM OF ALL RESPONSES TO PHQ9 QUESTIONS 1 & 2: 1
1. LITTLE INTEREST OR PLEASURE IN DOING THINGS: 0
SUM OF ALL RESPONSES TO PHQ QUESTIONS 1-9: 7
SUM OF ALL RESPONSES TO PHQ QUESTIONS 1-9: 7
7. TROUBLE CONCENTRATING ON THINGS, SUCH AS READING THE NEWSPAPER OR WATCHING TELEVISION: MORE THAN HALF THE DAYS
SUM OF ALL RESPONSES TO PHQ QUESTIONS 1-9: 7
3. TROUBLE FALLING OR STAYING ASLEEP: 0
8. MOVING OR SPEAKING SO SLOWLY THAT OTHER PEOPLE COULD HAVE NOTICED. OR THE OPPOSITE - BEING SO FIDGETY OR RESTLESS THAT YOU HAVE BEEN MOVING AROUND A LOT MORE THAN USUAL: NOT AT ALL
5. POOR APPETITE OR OVEREATING: 3

## 2023-08-07 ENCOUNTER — OFFICE VISIT (OUTPATIENT)
Dept: FAMILY MEDICINE CLINIC | Facility: CLINIC | Age: 43
End: 2023-08-07

## 2023-08-07 VITALS
HEIGHT: 62 IN | BODY MASS INDEX: 34.74 KG/M2 | OXYGEN SATURATION: 99 % | DIASTOLIC BLOOD PRESSURE: 60 MMHG | HEART RATE: 87 BPM | WEIGHT: 188.8 LBS | SYSTOLIC BLOOD PRESSURE: 100 MMHG | TEMPERATURE: 98.4 F

## 2023-08-07 DIAGNOSIS — F32.A ANXIETY AND DEPRESSION: ICD-10-CM

## 2023-08-07 DIAGNOSIS — R53.83 FATIGUE, UNSPECIFIED TYPE: ICD-10-CM

## 2023-08-07 DIAGNOSIS — Z86.39 HISTORY OF HYPOTHYROIDISM: ICD-10-CM

## 2023-08-07 DIAGNOSIS — A53.9 SYPHILIS: Primary | ICD-10-CM

## 2023-08-07 DIAGNOSIS — E66.09 CLASS 1 OBESITY DUE TO EXCESS CALORIES WITH SERIOUS COMORBIDITY AND BODY MASS INDEX (BMI) OF 34.0 TO 34.9 IN ADULT: ICD-10-CM

## 2023-08-07 DIAGNOSIS — F41.9 ANXIETY AND DEPRESSION: ICD-10-CM

## 2023-08-07 DIAGNOSIS — K59.00 CONSTIPATION, UNSPECIFIED CONSTIPATION TYPE: ICD-10-CM

## 2023-08-07 DIAGNOSIS — F51.04 PSYCHOPHYSIOLOGICAL INSOMNIA: ICD-10-CM

## 2023-08-07 PROBLEM — E66.811 CLASS 1 OBESITY DUE TO EXCESS CALORIES WITH SERIOUS COMORBIDITY AND BODY MASS INDEX (BMI) OF 34.0 TO 34.9 IN ADULT: Status: ACTIVE | Noted: 2023-08-07

## 2023-08-07 LAB — TSH W FREE THYROID IF ABNORMAL: 2.22 UIU/ML (ref 0.36–3.74)

## 2023-08-07 PROCEDURE — 99213 OFFICE O/P EST LOW 20 MIN: CPT | Performed by: NURSE PRACTITIONER

## 2023-08-07 ASSESSMENT — ENCOUNTER SYMPTOMS
RESPIRATORY NEGATIVE: 1
CONSTIPATION: 1

## 2023-08-07 NOTE — PROGRESS NOTES
Gordon Lai is a 37 y.o. female who presents today for the following:  Chief Complaint   Patient presents with    Follow-up    Other     Pt contracted an STD and have some questions       Allergies   Allergen Reactions    Lavender Oil Hives, Itching and Rash    Penicillins Diarrhea, Hives, Itching and Rash       Current Outpatient Medications   Medication Sig Dispense Refill    ARIPiprazole (ABILIFY) 5 MG tablet Take 1 tablet by mouth every morning 90 tablet 1    FLUoxetine (PROZAC) 40 MG capsule Take 1 capsule by mouth every morning 90 capsule 1    traZODone (DESYREL) 100 MG tablet Take 1 tablet by mouth nightly 90 tablet 1    prazosin (MINIPRESS) 2 MG capsule Take 1 capsule by mouth nightly 90 capsule 1     No current facility-administered medications for this visit. Past Medical History:   Diagnosis Date    ADHD (attention deficit hyperactivity disorder)     Anxiety     Depression     Hypothyroidism     PTSD (post-traumatic stress disorder)     Substance abuse ([address])        Past Surgical History:   Procedure Laterality Date    APPENDECTOMY      BREAST REDUCTION SURGERY Bilateral     CHOLECYSTECTOMY         Social History     Tobacco Use    Smoking status: Never     Passive exposure: Never    Smokeless tobacco: Never   Substance Use Topics    Alcohol use: Yes     Comment: drinks once every few months        Family History   Problem Relation Age of Onset    Mental Illness Mother     Mental Illness Father     Mental Illness Brother     Colon Cancer Maternal Grandmother         diagnosed 62s    No Known Problems Maternal Grandfather     Alzheimer's Disease Paternal Grandmother     No Known Problems Paternal Grandfather        Patient presents for 6 month follow up conditions listed. Hx. PTSD with nightmares, anxiety, depression, and insomnia, and syphilis. She is currently in the [email or web address]  She was referred to the health department for treatment for her syphilis.     Specialists:  Psychiatry

## 2023-09-25 ENCOUNTER — OFFICE VISIT (OUTPATIENT)
Dept: FAMILY MEDICINE CLINIC | Facility: CLINIC | Age: 43
End: 2023-09-25

## 2023-09-25 VITALS
SYSTOLIC BLOOD PRESSURE: 118 MMHG | TEMPERATURE: 98.4 F | OXYGEN SATURATION: 96 % | HEART RATE: 80 BPM | DIASTOLIC BLOOD PRESSURE: 80 MMHG

## 2023-09-25 DIAGNOSIS — J06.9 VIRAL URI: Primary | ICD-10-CM

## 2023-09-25 LAB
EXP DATE SOLUTION: NORMAL
EXP DATE SWAB: NORMAL
EXPIRATION DATE: NORMAL
INFLUENZA A ANTIGEN, POC: NEGATIVE
INFLUENZA B ANTIGEN, POC: NEGATIVE
LOT NUMBER POC: NORMAL
LOT NUMBER SOLUTION: NORMAL
LOT NUMBER SWAB: NORMAL
SARS-COV-2 RNA, POC: NEGATIVE
VALID INTERNAL CONTROL, POC: YES

## 2023-09-25 PROCEDURE — 87804 INFLUENZA ASSAY W/OPTIC: CPT | Performed by: NURSE PRACTITIONER

## 2023-09-25 PROCEDURE — 87635 SARS-COV-2 COVID-19 AMP PRB: CPT | Performed by: NURSE PRACTITIONER

## 2023-09-25 PROCEDURE — 99213 OFFICE O/P EST LOW 20 MIN: CPT | Performed by: NURSE PRACTITIONER

## 2023-09-25 RX ORDER — GUAIFENESIN 600 MG/1
600 TABLET, EXTENDED RELEASE ORAL 2 TIMES DAILY PRN
Qty: 30 TABLET | Refills: 0 | Status: SHIPPED | OUTPATIENT
Start: 2023-09-25 | End: 2023-10-10

## 2023-09-25 ASSESSMENT — PATIENT HEALTH QUESTIONNAIRE - PHQ9
9. THOUGHTS THAT YOU WOULD BE BETTER OFF DEAD, OR OF HURTING YOURSELF: 0
SUM OF ALL RESPONSES TO PHQ QUESTIONS 1-9: 3
SUM OF ALL RESPONSES TO PHQ9 QUESTIONS 1 & 2: 1
SUM OF ALL RESPONSES TO PHQ QUESTIONS 1-9: 1
3. TROUBLE FALLING OR STAYING ASLEEP: 1
SUM OF ALL RESPONSES TO PHQ QUESTIONS 1-9: 3
SUM OF ALL RESPONSES TO PHQ QUESTIONS 1-9: 3
SUM OF ALL RESPONSES TO PHQ QUESTIONS 1-9: 1
SUM OF ALL RESPONSES TO PHQ QUESTIONS 1-9: 1
6. FEELING BAD ABOUT YOURSELF - OR THAT YOU ARE A FAILURE OR HAVE LET YOURSELF OR YOUR FAMILY DOWN: 0
5. POOR APPETITE OR OVEREATING: 0
2. FEELING DOWN, DEPRESSED OR HOPELESS: 1
10. IF YOU CHECKED OFF ANY PROBLEMS, HOW DIFFICULT HAVE THESE PROBLEMS MADE IT FOR YOU TO DO YOUR WORK, TAKE CARE OF THINGS AT HOME, OR GET ALONG WITH OTHER PEOPLE: 1
1. LITTLE INTEREST OR PLEASURE IN DOING THINGS: 0
SUM OF ALL RESPONSES TO PHQ QUESTIONS 1-9: 3
4. FEELING TIRED OR HAVING LITTLE ENERGY: 1
7. TROUBLE CONCENTRATING ON THINGS, SUCH AS READING THE NEWSPAPER OR WATCHING TELEVISION: 1
8. MOVING OR SPEAKING SO SLOWLY THAT OTHER PEOPLE COULD HAVE NOTICED. OR THE OPPOSITE, BEING SO FIGETY OR RESTLESS THAT YOU HAVE BEEN MOVING AROUND A LOT MORE THAN USUAL: 0
SUM OF ALL RESPONSES TO PHQ QUESTIONS 1-9: 1

## 2023-09-25 ASSESSMENT — ENCOUNTER SYMPTOMS
VOMITING: 0
NAUSEA: 0
WHEEZING: 0
DIARRHEA: 0
SORE THROAT: 1
SHORTNESS OF BREATH: 1
SINUS PRESSURE: 1
COUGH: 1
TROUBLE SWALLOWING: 0

## 2023-09-25 NOTE — PATIENT INSTRUCTIONS
These type of infections typically resolve within 1-2 weeks. No antibiotics are needed and will not be helpful. Recommend supportive care of humidifier, honey, mucinex, tylenol, ibuprofen, rest and increased fluid intake. Advised to call for fever, worsening shortness of breath, chest pain, worsening persistent sinus pain or severe ear pain. Cough may linger for several weeks. Call if symptoms worsen.

## 2023-09-25 NOTE — PROGRESS NOTES
Sandrita Harris is a 37 y.o. female who presents today for the following:  Chief Complaint   Patient presents with    Congestion     Pt presents today with sinus congestion, drainage and cough x 3days. Allergies   Allergen Reactions    Lavender Oil Hives, Itching and Rash    Penicillins Diarrhea, Hives, Itching and Rash       Current Outpatient Medications   Medication Sig Dispense Refill    guaiFENesin (MUCINEX) 600 MG extended release tablet Take 1 tablet by mouth 2 times daily as needed for Congestion 30 tablet 0    ARIPiprazole (ABILIFY) 5 MG tablet Take 1 tablet by mouth every morning 90 tablet 1    FLUoxetine (PROZAC) 40 MG capsule Take 1 capsule by mouth every morning 90 capsule 1    traZODone (DESYREL) 100 MG tablet Take 1 tablet by mouth nightly 90 tablet 1    prazosin (MINIPRESS) 2 MG capsule Take 1 capsule by mouth nightly 90 capsule 1     No current facility-administered medications for this visit. Past Medical History:   Diagnosis Date    ADHD (attention deficit hyperactivity disorder)     Anxiety     Depression     Hypothyroidism     PTSD (post-traumatic stress disorder)     Substance abuse ([address] [facility])        Past Surgical History:   Procedure Laterality Date    APPENDECTOMY      BREAST REDUCTION SURGERY Bilateral     CHOLECYSTECTOMY         Social History     Tobacco Use    Smoking status: Never     Passive exposure: Never    Smokeless tobacco: Never   Substance Use Topics    Alcohol use: Yes     Comment: drinks once every few months        Family History   Problem Relation Age of Onset    Mental Illness Mother     Mental Illness Father     Mental Illness Brother     Colon Cancer Maternal Grandmother         diagnosed 62s    No Known Problems Maternal Grandfather     Alzheimer's Disease Paternal Grandmother     No Known Problems Paternal Grandfather        Patient presents for acute visit for upper respiratory symptoms about 3 days.   She is a resident at [facility] and is concerned about

## 2023-09-27 DIAGNOSIS — R05.1 ACUTE COUGH: Primary | ICD-10-CM

## 2023-09-27 RX ORDER — BENZONATATE 100 MG/1
100-200 CAPSULE ORAL 2 TIMES DAILY PRN
Qty: 28 CAPSULE | Refills: 0 | Status: SHIPPED | OUTPATIENT
Start: 2023-09-27 | End: 2023-10-04

## 2023-10-02 ENCOUNTER — OFFICE VISIT (OUTPATIENT)
Dept: BEHAVIORAL/MENTAL HEALTH CLINIC | Age: 43
End: 2023-10-02

## 2023-10-02 VITALS
HEART RATE: 86 BPM | BODY MASS INDEX: 34.6 KG/M2 | WEIGHT: 188 LBS | DIASTOLIC BLOOD PRESSURE: 82 MMHG | OXYGEN SATURATION: 99 % | SYSTOLIC BLOOD PRESSURE: 114 MMHG | HEIGHT: 62 IN

## 2023-10-02 DIAGNOSIS — F51.5 NIGHTMARES ASSOCIATED WITH CHRONIC POST-TRAUMATIC STRESS DISORDER: ICD-10-CM

## 2023-10-02 DIAGNOSIS — F41.9 ANXIETY AND DEPRESSION: ICD-10-CM

## 2023-10-02 DIAGNOSIS — F43.12 NIGHTMARES ASSOCIATED WITH CHRONIC POST-TRAUMATIC STRESS DISORDER: ICD-10-CM

## 2023-10-02 DIAGNOSIS — F32.A ANXIETY AND DEPRESSION: ICD-10-CM

## 2023-10-02 DIAGNOSIS — F43.10 POST TRAUMATIC STRESS DISORDER (PTSD): ICD-10-CM

## 2023-10-02 DIAGNOSIS — F51.04 PSYCHOPHYSIOLOGICAL INSOMNIA: Primary | ICD-10-CM

## 2023-10-02 PROCEDURE — 99215 OFFICE O/P EST HI 40 MIN: CPT | Performed by: NURSE PRACTITIONER

## 2023-10-02 RX ORDER — ARIPIPRAZOLE 5 MG/1
5 TABLET ORAL EVERY MORNING
Qty: 90 TABLET | Refills: 1 | Status: SHIPPED | OUTPATIENT
Start: 2023-10-02 | End: 2023-12-31

## 2023-10-02 RX ORDER — PRAZOSIN HYDROCHLORIDE 2 MG/1
2 CAPSULE ORAL NIGHTLY
Qty: 90 CAPSULE | Refills: 1 | Status: SHIPPED | OUTPATIENT
Start: 2023-10-02 | End: 2023-12-31

## 2023-10-02 RX ORDER — FLUOXETINE HYDROCHLORIDE 40 MG/1
40 CAPSULE ORAL EVERY MORNING
Qty: 90 CAPSULE | Refills: 1 | Status: SHIPPED | OUTPATIENT
Start: 2023-10-02 | End: 2023-12-31

## 2023-10-02 RX ORDER — TRAZODONE HYDROCHLORIDE 150 MG/1
150 TABLET ORAL NIGHTLY
Qty: 90 TABLET | Refills: 1 | Status: SHIPPED | OUTPATIENT
Start: 2023-10-02 | End: 2023-12-31

## 2023-10-02 RX ORDER — FLUOXETINE HYDROCHLORIDE 20 MG/1
20 CAPSULE ORAL DAILY
Qty: 90 CAPSULE | Refills: 1 | Status: SHIPPED | OUTPATIENT
Start: 2023-10-02 | End: 2023-12-31

## 2023-10-02 NOTE — PROGRESS NOTES
with patient. Plan:  -  Continue  -  Crisis plan reviewed and patient verbally contracts for safety. Go to ED with emergent symptoms or safety concerns.  -  Risks, benefits, side effects of medications, including any / all black box warnings, discussed with patient, who verbalizes their understanding.     - [employer] web site checked for controlled substances. Disposition:  home    Follow Up:  Return in 6 weeks, or call in the interim for any side-effects, decompensation, questions, or problems between now and the next visit. Return in about 6 weeks (around 11/10/2023) for Follow up. 48 minutes face to face with the patient with more than 50% of the total time spent on education, counseling, & coordination of care of the patient regarding ongoing psychiatric illness.         [address] [place] [address]

## 2023-11-10 ENCOUNTER — OFFICE VISIT (OUTPATIENT)
Dept: BEHAVIORAL/MENTAL HEALTH CLINIC | Age: 43
End: 2023-11-10

## 2023-11-10 VITALS
HEIGHT: 62 IN | HEART RATE: 84 BPM | DIASTOLIC BLOOD PRESSURE: 60 MMHG | BODY MASS INDEX: 34.39 KG/M2 | OXYGEN SATURATION: 97 % | SYSTOLIC BLOOD PRESSURE: 106 MMHG | TEMPERATURE: 98.5 F

## 2023-11-10 DIAGNOSIS — F51.5 NIGHTMARES ASSOCIATED WITH CHRONIC POST-TRAUMATIC STRESS DISORDER: ICD-10-CM

## 2023-11-10 DIAGNOSIS — F51.04 PSYCHOPHYSIOLOGICAL INSOMNIA: Primary | ICD-10-CM

## 2023-11-10 DIAGNOSIS — F43.12 NIGHTMARES ASSOCIATED WITH CHRONIC POST-TRAUMATIC STRESS DISORDER: ICD-10-CM

## 2023-11-10 DIAGNOSIS — F32.A ANXIETY AND DEPRESSION: ICD-10-CM

## 2023-11-10 DIAGNOSIS — F41.9 ANXIETY AND DEPRESSION: ICD-10-CM

## 2023-11-10 DIAGNOSIS — F43.10 POST TRAUMATIC STRESS DISORDER (PTSD): ICD-10-CM

## 2023-11-10 PROCEDURE — 99215 OFFICE O/P EST HI 40 MIN: CPT | Performed by: NURSE PRACTITIONER

## 2023-11-10 RX ORDER — FLUOXETINE HYDROCHLORIDE 40 MG/1
40 CAPSULE ORAL EVERY MORNING
Qty: 90 CAPSULE | Refills: 1 | Status: SHIPPED | OUTPATIENT
Start: 2023-11-10 | End: 2024-02-08

## 2023-11-10 RX ORDER — TRAZODONE HYDROCHLORIDE 100 MG/1
200 TABLET ORAL NIGHTLY
Qty: 180 TABLET | Refills: 1 | Status: SHIPPED | OUTPATIENT
Start: 2023-11-10 | End: 2024-02-08

## 2023-11-10 RX ORDER — FLUOXETINE HYDROCHLORIDE 20 MG/1
20 CAPSULE ORAL DAILY
Qty: 90 CAPSULE | Refills: 1 | Status: SHIPPED | OUTPATIENT
Start: 2023-11-10 | End: 2024-02-08

## 2023-11-10 RX ORDER — PRAZOSIN HYDROCHLORIDE 1 MG/1
3 CAPSULE ORAL NIGHTLY
Qty: 270 CAPSULE | Refills: 1 | Status: SHIPPED | OUTPATIENT
Start: 2023-11-10 | End: 2024-02-08

## 2023-11-10 RX ORDER — ARIPIPRAZOLE 10 MG/1
10 TABLET ORAL EVERY MORNING
Qty: 90 TABLET | Refills: 1 | Status: SHIPPED | OUTPATIENT
Start: 2023-11-10 | End: 2024-02-08

## 2023-11-10 ASSESSMENT — PATIENT HEALTH QUESTIONNAIRE - PHQ9
2. FEELING DOWN, DEPRESSED OR HOPELESS: 1
7. TROUBLE CONCENTRATING ON THINGS, SUCH AS READING THE NEWSPAPER OR WATCHING TELEVISION: 0
5. POOR APPETITE OR OVEREATING: 3
SUM OF ALL RESPONSES TO PHQ QUESTIONS 1-9: 10
SUM OF ALL RESPONSES TO PHQ9 QUESTIONS 1 & 2: 1
4. FEELING TIRED OR HAVING LITTLE ENERGY: 3
SUM OF ALL RESPONSES TO PHQ QUESTIONS 1-9: 10
1. LITTLE INTEREST OR PLEASURE IN DOING THINGS: 0
10. IF YOU CHECKED OFF ANY PROBLEMS, HOW DIFFICULT HAVE THESE PROBLEMS MADE IT FOR YOU TO DO YOUR WORK, TAKE CARE OF THINGS AT HOME, OR GET ALONG WITH OTHER PEOPLE: 0
6. FEELING BAD ABOUT YOURSELF - OR THAT YOU ARE A FAILURE OR HAVE LET YOURSELF OR YOUR FAMILY DOWN: 0
9. THOUGHTS THAT YOU WOULD BE BETTER OFF DEAD, OR OF HURTING YOURSELF: 0
SUM OF ALL RESPONSES TO PHQ QUESTIONS 1-9: 10
3. TROUBLE FALLING OR STAYING ASLEEP: 3
SUM OF ALL RESPONSES TO PHQ QUESTIONS 1-9: 10
8. MOVING OR SPEAKING SO SLOWLY THAT OTHER PEOPLE COULD HAVE NOTICED. OR THE OPPOSITE, BEING SO FIGETY OR RESTLESS THAT YOU HAVE BEEN MOVING AROUND A LOT MORE THAN USUAL: 0

## 2023-11-10 ASSESSMENT — COLUMBIA-SUICIDE SEVERITY RATING SCALE - C-SSRS
4. HAVE YOU HAD THESE THOUGHTS AND HAD SOME INTENTION OF ACTING ON THEM?: NO
5. HAVE YOU STARTED TO WORK OUT OR WORKED OUT THE DETAILS OF HOW TO KILL YOURSELF? DO YOU INTEND TO CARRY OUT THIS PLAN?: NO
7. DID THIS OCCUR IN THE LAST THREE MONTHS: NO
3. HAVE YOU BEEN THINKING ABOUT HOW YOU MIGHT KILL YOURSELF?: NO

## 2023-11-10 NOTE — PROGRESS NOTES
OUTPATIENT PSYCHIATRIC RETURN VISIT PROGRESS NOTE    Date of Service: 11/10/2023     Identification    Rafael Munguia is a 37 y.o.  with a past psychiatric history of PTSD, nightmares, insomnia, depression/anxiety  who presents today for a psychiatric follow up appointment. CC:  Routine medication management follow up. Chief Complaint   Patient presents with    Follow-up     6 week follow up         Subjective / Interval History:    Pt comes to clinic today and reports that increased dose fluoxetine to 60 mg daily at last visit has been effective for depressive symptoms. Severity/intensity of depressive Sx decreased. Will increase Abilify to 10 mg Q AM. Nightmares decrease with Prazosin 2 mg but still occurring. Will increase Prazosin to 3 mg HS. Continues to have frequent nighttime awakenings Q 1-2 hours, not feeling rested during the day. Feels tired/fatigued all day everyday . Will increase Trazodone to 200 mg HS, may need further upward titration depending on response. She is doing very well at [employer], engaging in all therapies and activities. Working at [employer] and doing well. Pt has been medication compliant and denies any side-effects. Pt denies SI, HI and AVH. Does not endorse any manic or psychotic symptoms.     Psychiatric Review Of Systems:  Sleep: nightime awakenings  Appetite: ok  Current suicidal/homicidal ideations: Denies SI/ HI  Current auditory/visual hallucinations: Denies     Medications:    Current Outpatient Medications:     traZODone (DESYREL) 150 MG tablet, Take 1 tablet by mouth nightly, Disp: 90 tablet, Rfl: 1    prazosin (MINIPRESS) 2 MG capsule, Take 1 capsule by mouth nightly, Disp: 90 capsule, Rfl: 1    FLUoxetine (PROZAC) 40 MG capsule, Take 1 capsule by mouth every morning, Disp: 90 capsule, Rfl: 1    FLUoxetine (PROZAC) 20 MG capsule, Take 1 capsule by mouth daily Take with the 40 mg capsule to equal 60 mg daily, Disp: 90 capsule, Rfl: 1    ARIPiprazole [Initial Consultation] : an initial consultation for [Hives] : hives

## 2023-11-27 DIAGNOSIS — F43.12 NIGHTMARES ASSOCIATED WITH CHRONIC POST-TRAUMATIC STRESS DISORDER: ICD-10-CM

## 2023-11-27 DIAGNOSIS — F51.5 NIGHTMARES ASSOCIATED WITH CHRONIC POST-TRAUMATIC STRESS DISORDER: ICD-10-CM

## 2023-11-28 ENCOUNTER — OFFICE VISIT (OUTPATIENT)
Dept: FAMILY MEDICINE CLINIC | Facility: CLINIC | Age: 43
End: 2023-11-28

## 2023-11-28 VITALS
HEART RATE: 80 BPM | DIASTOLIC BLOOD PRESSURE: 74 MMHG | SYSTOLIC BLOOD PRESSURE: 114 MMHG | OXYGEN SATURATION: 98 % | BODY MASS INDEX: 37.49 KG/M2 | TEMPERATURE: 98 F | WEIGHT: 205 LBS

## 2023-11-28 DIAGNOSIS — M62.89 PELVIC FLOOR DYSFUNCTION IN FEMALE: ICD-10-CM

## 2023-11-28 DIAGNOSIS — R39.9 LOWER URINARY TRACT SYMPTOMS (LUTS): Primary | ICD-10-CM

## 2023-11-28 DIAGNOSIS — N39.3 URINARY, INCONTINENCE, STRESS FEMALE: ICD-10-CM

## 2023-11-28 LAB
BILIRUBIN, URINE, POC: NEGATIVE
BLOOD URINE, POC: NORMAL
GLUCOSE URINE, POC: NEGATIVE
KETONES, URINE, POC: NEGATIVE
LEUKOCYTE ESTERASE, URINE, POC: NEGATIVE
NITRITE, URINE, POC: NEGATIVE
PH, URINE, POC: 5.5 (ref 4.6–8)
PROTEIN,URINE, POC: NEGATIVE
SPECIFIC GRAVITY, URINE, POC: 1.02 (ref 1–1.03)
URINALYSIS CLARITY, POC: CLEAR
URINALYSIS COLOR, POC: YELLOW
UROBILINOGEN, POC: NORMAL

## 2023-11-28 PROCEDURE — 81003 URINALYSIS AUTO W/O SCOPE: CPT | Performed by: NURSE PRACTITIONER

## 2023-11-28 PROCEDURE — 99213 OFFICE O/P EST LOW 20 MIN: CPT | Performed by: NURSE PRACTITIONER

## 2023-11-28 ASSESSMENT — PATIENT HEALTH QUESTIONNAIRE - PHQ9
SUM OF ALL RESPONSES TO PHQ QUESTIONS 1-9: 4
2. FEELING DOWN, DEPRESSED OR HOPELESS: 0
1. LITTLE INTEREST OR PLEASURE IN DOING THINGS: 0
6. FEELING BAD ABOUT YOURSELF - OR THAT YOU ARE A FAILURE OR HAVE LET YOURSELF OR YOUR FAMILY DOWN: 0
SUM OF ALL RESPONSES TO PHQ QUESTIONS 1-9: 0
SUM OF ALL RESPONSES TO PHQ9 QUESTIONS 1 & 2: 0
9. THOUGHTS THAT YOU WOULD BE BETTER OFF DEAD, OR OF HURTING YOURSELF: 0
3. TROUBLE FALLING OR STAYING ASLEEP: 3
7. TROUBLE CONCENTRATING ON THINGS, SUCH AS READING THE NEWSPAPER OR WATCHING TELEVISION: 0
SUM OF ALL RESPONSES TO PHQ QUESTIONS 1-9: 4
4. FEELING TIRED OR HAVING LITTLE ENERGY: 1
5. POOR APPETITE OR OVEREATING: 0
SUM OF ALL RESPONSES TO PHQ QUESTIONS 1-9: 0
SUM OF ALL RESPONSES TO PHQ QUESTIONS 1-9: 0
8. MOVING OR SPEAKING SO SLOWLY THAT OTHER PEOPLE COULD HAVE NOTICED. OR THE OPPOSITE, BEING SO FIGETY OR RESTLESS THAT YOU HAVE BEEN MOVING AROUND A LOT MORE THAN USUAL: 0
SUM OF ALL RESPONSES TO PHQ QUESTIONS 1-9: 0
SUM OF ALL RESPONSES TO PHQ QUESTIONS 1-9: 4
10. IF YOU CHECKED OFF ANY PROBLEMS, HOW DIFFICULT HAVE THESE PROBLEMS MADE IT FOR YOU TO DO YOUR WORK, TAKE CARE OF THINGS AT HOME, OR GET ALONG WITH OTHER PEOPLE: 0
SUM OF ALL RESPONSES TO PHQ QUESTIONS 1-9: 4

## 2023-11-28 ASSESSMENT — COLUMBIA-SUICIDE SEVERITY RATING SCALE - C-SSRS
4. HAVE YOU HAD THESE THOUGHTS AND HAD SOME INTENTION OF ACTING ON THEM?: NO
3. HAVE YOU BEEN THINKING ABOUT HOW YOU MIGHT KILL YOURSELF?: NO
5. HAVE YOU STARTED TO WORK OUT OR WORKED OUT THE DETAILS OF HOW TO KILL YOURSELF? DO YOU INTEND TO CARRY OUT THIS PLAN?: NO
7. DID THIS OCCUR IN THE LAST THREE MONTHS: NO

## 2023-11-28 ASSESSMENT — ENCOUNTER SYMPTOMS
CONSTIPATION: 0
VOMITING: 0
NAUSEA: 0

## 2023-12-04 RX ORDER — PRAZOSIN HYDROCHLORIDE 1 MG/1
3 CAPSULE ORAL NIGHTLY
Qty: 270 CAPSULE | Refills: 1 | OUTPATIENT
Start: 2023-12-04 | End: 2024-03-03

## 2024-02-23 RX ORDER — ARIPIPRAZOLE 10 MG/1
10 TABLET ORAL EVERY MORNING
Qty: 90 TABLET | Refills: 1 | OUTPATIENT
Start: 2024-02-23 | End: 2024-05-23

## 2024-03-05 ENCOUNTER — TELEMEDICINE (OUTPATIENT)
Dept: BEHAVIORAL/MENTAL HEALTH CLINIC | Age: 44
End: 2024-03-05

## 2024-03-05 DIAGNOSIS — F41.9 ANXIETY AND DEPRESSION: Primary | ICD-10-CM

## 2024-03-05 DIAGNOSIS — F51.5 NIGHTMARES ASSOCIATED WITH CHRONIC POST-TRAUMATIC STRESS DISORDER: ICD-10-CM

## 2024-03-05 DIAGNOSIS — F43.10 POST TRAUMATIC STRESS DISORDER (PTSD): ICD-10-CM

## 2024-03-05 DIAGNOSIS — F43.12 NIGHTMARES ASSOCIATED WITH CHRONIC POST-TRAUMATIC STRESS DISORDER: ICD-10-CM

## 2024-03-05 DIAGNOSIS — F51.04 PSYCHOPHYSIOLOGICAL INSOMNIA: ICD-10-CM

## 2024-03-05 DIAGNOSIS — F32.A ANXIETY AND DEPRESSION: Primary | ICD-10-CM

## 2024-03-05 PROCEDURE — 99215 OFFICE O/P EST HI 40 MIN: CPT | Performed by: NURSE PRACTITIONER

## 2024-03-05 RX ORDER — FLUOXETINE HYDROCHLORIDE 20 MG/1
20 CAPSULE ORAL DAILY
Qty: 90 CAPSULE | Refills: 1 | Status: SHIPPED | OUTPATIENT
Start: 2024-03-05 | End: 2024-09-01

## 2024-03-05 RX ORDER — ARIPIPRAZOLE 10 MG/1
10 TABLET ORAL EVERY MORNING
Qty: 90 TABLET | Refills: 2 | Status: SHIPPED | OUTPATIENT
Start: 2024-03-05 | End: 2024-11-30

## 2024-03-05 RX ORDER — TRAZODONE HYDROCHLORIDE 100 MG/1
200 TABLET ORAL NIGHTLY
Qty: 180 TABLET | Refills: 1 | Status: SHIPPED | OUTPATIENT
Start: 2024-03-05 | End: 2024-09-01

## 2024-03-05 RX ORDER — FLUOXETINE HYDROCHLORIDE 40 MG/1
40 CAPSULE ORAL EVERY MORNING
Qty: 90 CAPSULE | Refills: 1 | Status: SHIPPED | OUTPATIENT
Start: 2024-03-05 | End: 2024-09-01

## 2024-03-05 RX ORDER — ARIPIPRAZOLE 10 MG/1
10 TABLET ORAL EVERY MORNING
Qty: 30 TABLET | Refills: 0 | Status: SHIPPED | OUTPATIENT
Start: 2024-03-05 | End: 2024-04-04

## 2024-03-05 RX ORDER — PRAZOSIN HYDROCHLORIDE 5 MG/1
5 CAPSULE ORAL NIGHTLY
Qty: 30 CAPSULE | Refills: 3 | Status: SHIPPED | OUTPATIENT
Start: 2024-03-05 | End: 2024-07-03

## 2024-03-05 ASSESSMENT — PATIENT HEALTH QUESTIONNAIRE - PHQ9
2. FEELING DOWN, DEPRESSED OR HOPELESS: 0
3. TROUBLE FALLING OR STAYING ASLEEP: 0
6. FEELING BAD ABOUT YOURSELF - OR THAT YOU ARE A FAILURE OR HAVE LET YOURSELF OR YOUR FAMILY DOWN: 0
1. LITTLE INTEREST OR PLEASURE IN DOING THINGS: 0
10. IF YOU CHECKED OFF ANY PROBLEMS, HOW DIFFICULT HAVE THESE PROBLEMS MADE IT FOR YOU TO DO YOUR WORK, TAKE CARE OF THINGS AT HOME, OR GET ALONG WITH OTHER PEOPLE: 0
5. POOR APPETITE OR OVEREATING: 0
4. FEELING TIRED OR HAVING LITTLE ENERGY: 0
8. MOVING OR SPEAKING SO SLOWLY THAT OTHER PEOPLE COULD HAVE NOTICED. OR THE OPPOSITE, BEING SO FIGETY OR RESTLESS THAT YOU HAVE BEEN MOVING AROUND A LOT MORE THAN USUAL: 0
SUM OF ALL RESPONSES TO PHQ QUESTIONS 1-9: 2
9. THOUGHTS THAT YOU WOULD BE BETTER OFF DEAD, OR OF HURTING YOURSELF: 0
SUM OF ALL RESPONSES TO PHQ QUESTIONS 1-9: 2
SUM OF ALL RESPONSES TO PHQ QUESTIONS 1-9: 2
SUM OF ALL RESPONSES TO PHQ9 QUESTIONS 1 & 2: 0
SUM OF ALL RESPONSES TO PHQ QUESTIONS 1-9: 2
7. TROUBLE CONCENTRATING ON THINGS, SUCH AS READING THE NEWSPAPER OR WATCHING TELEVISION: 2

## 2024-03-05 NOTE — PROGRESS NOTES
OUTPATIENT PSYCHIATRIC RETURN VISIT PROGRESS NOTE    I was in the office While conducting this encounter.Pt was at home    She and/ or her healthcare decision maker is aware that this patient-initialed Telehealth encounter is a billable service with coverage as determined by her insurance carrier.  She is aware that she may receive a bill and has provided verbal consent to proceed: Yes    The Virtual visit was conducted via [employer]. Pursuant to the emergency declaration under the [place] Act and the National Emergencies Act, 1135 waiver authority and the Coronavirus Preparedness and Response Supplemental Appropriations Act, this Virtual  Visit was conducted to reduce the patient's risk of exposure to COVID-19 and provide continuity of care for an established patient. Services were provided through a video synchronous discussion virtually to substitute for in-person clinic visit.  Due to this being a TeleHealth evaluation, many elements of the physical examination are unable to be assessed.     Total Time:  minutes: 40-54 minutes         Date of Service: 3/5/2024     Identification    Nga Gould is a 43 y.o.  with a past psychiatric history of PTSD, nightmares, insomnia, depression, anxiety  who presents today for a psychiatric follow up appointment.      CC:  Routine medication management follow up.    Chief Complaint   Patient presents with    Follow-up     Pt presents today for follow up        Subjective / Interval History:    Pt visit via My Chart today and reports that mood remains stable with current medication regimen with no adverse effects reported. She continues to experience PTSD - related nightmares; will increase Prazosin to 5 mg HS. Continue all other medications as currently ordered.   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 She continues to thrive at [facility].   Pt has been medication compliant and denies any side-effects. Pt denies SI, HI

## 2024-03-21 DIAGNOSIS — F51.5 NIGHTMARES ASSOCIATED WITH CHRONIC POST-TRAUMATIC STRESS DISORDER: ICD-10-CM

## 2024-03-21 DIAGNOSIS — F51.04 PSYCHOPHYSIOLOGICAL INSOMNIA: ICD-10-CM

## 2024-03-21 DIAGNOSIS — F43.12 NIGHTMARES ASSOCIATED WITH CHRONIC POST-TRAUMATIC STRESS DISORDER: ICD-10-CM

## 2024-03-22 RX ORDER — FLUOXETINE HYDROCHLORIDE 40 MG/1
40 CAPSULE ORAL EVERY MORNING
Qty: 90 CAPSULE | Refills: 1 | Status: SHIPPED | OUTPATIENT
Start: 2024-03-22 | End: 2024-09-18

## 2024-03-22 RX ORDER — TRAZODONE HYDROCHLORIDE 100 MG/1
200 TABLET ORAL NIGHTLY
Qty: 180 TABLET | Refills: 1 | Status: SHIPPED | OUTPATIENT
Start: 2024-03-22 | End: 2024-09-18

## 2024-03-22 RX ORDER — PRAZOSIN HYDROCHLORIDE 5 MG/1
5 CAPSULE ORAL NIGHTLY
Qty: 30 CAPSULE | Refills: 3 | Status: SHIPPED | OUTPATIENT
Start: 2024-03-22 | End: 2024-07-20

## 2024-03-22 RX ORDER — ARIPIPRAZOLE 10 MG/1
10 TABLET ORAL EVERY MORNING
Qty: 30 TABLET | Refills: 0 | Status: SHIPPED | OUTPATIENT
Start: 2024-03-22 | End: 2024-04-21

## 2024-03-22 RX ORDER — ARIPIPRAZOLE 10 MG/1
10 TABLET ORAL EVERY MORNING
Qty: 90 TABLET | Refills: 2 | Status: SHIPPED | OUTPATIENT
Start: 2024-03-22 | End: 2024-12-17

## 2024-03-22 RX ORDER — FLUOXETINE HYDROCHLORIDE 20 MG/1
20 CAPSULE ORAL DAILY
Qty: 90 CAPSULE | Refills: 1 | Status: SHIPPED | OUTPATIENT
Start: 2024-03-22 | End: 2024-09-18

## 2024-04-02 ENCOUNTER — TRANSCRIBE ORDERS (OUTPATIENT)
Dept: SCHEDULING | Age: 44
End: 2024-04-02

## 2024-04-02 DIAGNOSIS — Z12.31 ENCOUNTER FOR SCREENING MAMMOGRAM FOR MALIGNANT NEOPLASM OF BREAST: Primary | ICD-10-CM

## 2024-12-30 ENCOUNTER — OFFICE VISIT (OUTPATIENT)
Dept: FAMILY MEDICINE CLINIC | Facility: CLINIC | Age: 44
End: 2024-12-30
Payer: COMMERCIAL

## 2024-12-30 VITALS
OXYGEN SATURATION: 98 % | SYSTOLIC BLOOD PRESSURE: 110 MMHG | HEART RATE: 85 BPM | DIASTOLIC BLOOD PRESSURE: 84 MMHG | TEMPERATURE: 98.4 F

## 2024-12-30 DIAGNOSIS — H61.23 BILATERAL IMPACTED CERUMEN: ICD-10-CM

## 2024-12-30 DIAGNOSIS — J02.9 SORE THROAT: Primary | ICD-10-CM

## 2024-12-30 DIAGNOSIS — K12.1 STOMATITIS: ICD-10-CM

## 2024-12-30 LAB
GROUP A STREP ANTIGEN, POC: NEGATIVE
VALID INTERNAL CONTROL, POC: YES

## 2024-12-30 PROCEDURE — 87880 STREP A ASSAY W/OPTIC: CPT | Performed by: FAMILY MEDICINE

## 2024-12-30 PROCEDURE — 99214 OFFICE O/P EST MOD 30 MIN: CPT | Performed by: FAMILY MEDICINE

## 2024-12-30 PROCEDURE — 69209 REMOVE IMPACTED EAR WAX UNI: CPT | Performed by: FAMILY MEDICINE

## 2024-12-30 RX ORDER — DEXAMETHASONE 0.5 MG/5ML
0.5 ELIXIR ORAL 2 TIMES DAILY PRN
Qty: 100 ML | Refills: 0 | Status: SHIPPED | OUTPATIENT
Start: 2024-12-30 | End: 2025-01-09

## 2024-12-30 SDOH — ECONOMIC STABILITY: FOOD INSECURITY: WITHIN THE PAST 12 MONTHS, THE FOOD YOU BOUGHT JUST DIDN'T LAST AND YOU DIDN'T HAVE MONEY TO GET MORE.: NEVER TRUE

## 2024-12-30 SDOH — ECONOMIC STABILITY: FOOD INSECURITY: WITHIN THE PAST 12 MONTHS, YOU WORRIED THAT YOUR FOOD WOULD RUN OUT BEFORE YOU GOT MONEY TO BUY MORE.: NEVER TRUE

## 2024-12-30 SDOH — ECONOMIC STABILITY: INCOME INSECURITY: HOW HARD IS IT FOR YOU TO PAY FOR THE VERY BASICS LIKE FOOD, HOUSING, MEDICAL CARE, AND HEATING?: NOT HARD AT ALL

## 2024-12-30 ASSESSMENT — ENCOUNTER SYMPTOMS
SORE THROAT: 1
SHORTNESS OF BREATH: 0

## 2024-12-30 NOTE — PROGRESS NOTES
Nga Gould is a 44 y.o. female who presents today for the following:  Chief Complaint   Patient presents with    Pharyngitis     Pt presents today with sore throat, rt ear pain, sinus drainage, cough, fatigue x 2 wks.         Allergies   Allergen Reactions    Lavender Oil Hives, Itching and Rash    Penicillins Diarrhea, Hives, Itching and Rash       Current Outpatient Medications   Medication Sig Dispense Refill    ARIPiprazole (ABILIFY) 10 MG tablet Take 1 tablet by mouth every morning 30 tablet 0    prazosin (MINIPRESS) 5 MG capsule Take 1 capsule by mouth nightly 30 capsule 3    FLUoxetine (PROZAC) 20 MG capsule Take 1 capsule by mouth daily Take with the 40 mg capsule to equal 60 mg daily 90 capsule 1    ARIPiprazole (ABILIFY) 10 MG tablet Take 1 tablet by mouth every morning 90 tablet 2    FLUoxetine (PROZAC) 40 MG capsule Take 1 capsule by mouth every morning 90 capsule 1    traZODone (DESYREL) 100 MG tablet Take 2 tablets by mouth nightly 180 tablet 1     No current facility-administered medications for this visit.       Past Medical History:   Diagnosis Date    ADHD (attention deficit hyperactivity disorder)     Anxiety     Depression     Hypothyroidism     PTSD (post-traumatic stress disorder)     Substance abuse (HCC)        Past Surgical History:   Procedure Laterality Date    APPENDECTOMY      BREAST REDUCTION SURGERY Bilateral     CHOLECYSTECTOMY         Social History     Tobacco Use    Smoking status: Never     Passive exposure: Never    Smokeless tobacco: Never    Tobacco comments:     None personally, but both parents were heavy smokers throughout my life.   Substance Use Topics    Alcohol use: Yes     Comment: drinks once every few months        Family History   Problem Relation Age of Onset    Mental Illness Mother     Depression Mother     Mental Illness Father     Alcohol Abuse Father     Depression Father     Mental Illness Brother     Colon Cancer Maternal Grandmother         diagnosed 60s

## 2025-02-17 ENCOUNTER — OFFICE VISIT (OUTPATIENT)
Dept: FAMILY MEDICINE CLINIC | Facility: CLINIC | Age: 45
End: 2025-02-17
Payer: COMMERCIAL

## 2025-02-17 VITALS
WEIGHT: 195 LBS | HEART RATE: 82 BPM | DIASTOLIC BLOOD PRESSURE: 82 MMHG | TEMPERATURE: 98.4 F | OXYGEN SATURATION: 96 % | SYSTOLIC BLOOD PRESSURE: 112 MMHG | BODY MASS INDEX: 35.67 KG/M2

## 2025-02-17 DIAGNOSIS — N90.7 VULVAR CYST: Primary | ICD-10-CM

## 2025-02-17 PROCEDURE — 99212 OFFICE O/P EST SF 10 MIN: CPT | Performed by: NURSE PRACTITIONER

## 2025-02-17 SDOH — ECONOMIC STABILITY: FOOD INSECURITY: WITHIN THE PAST 12 MONTHS, YOU WORRIED THAT YOUR FOOD WOULD RUN OUT BEFORE YOU GOT MONEY TO BUY MORE.: NEVER TRUE

## 2025-02-17 SDOH — ECONOMIC STABILITY: FOOD INSECURITY: WITHIN THE PAST 12 MONTHS, THE FOOD YOU BOUGHT JUST DIDN'T LAST AND YOU DIDN'T HAVE MONEY TO GET MORE.: NEVER TRUE

## 2025-02-17 NOTE — PROGRESS NOTES
Nga Gould is a 44 y.o. female who presents today for the following:  Chief Complaint   Patient presents with   • Mass     Pt has vaginal bump that isn't going away x 3 mo.        Allergies   Allergen Reactions   • Lavender Oil Hives, Itching and Rash   • Penicillins Diarrhea, Hives, Itching and Rash       Current Outpatient Medications   Medication Sig Dispense Refill   • ARIPiprazole (ABILIFY) 10 MG tablet Take 1 tablet by mouth every morning 30 tablet 0   • prazosin (MINIPRESS) 5 MG capsule Take 1 capsule by mouth nightly 30 capsule 3   • FLUoxetine (PROZAC) 20 MG capsule Take 1 capsule by mouth daily Take with the 40 mg capsule to equal 60 mg daily 90 capsule 1   • ARIPiprazole (ABILIFY) 10 MG tablet Take 1 tablet by mouth every morning 90 tablet 2   • FLUoxetine (PROZAC) 40 MG capsule Take 1 capsule by mouth every morning 90 capsule 1   • traZODone (DESYREL) 100 MG tablet Take 2 tablets by mouth nightly 180 tablet 1     No current facility-administered medications for this visit.       Past Medical History:   Diagnosis Date   • ADHD (attention deficit hyperactivity disorder)    • Anxiety    • Depression    • Hypothyroidism    • PTSD (post-traumatic stress disorder)    • Substance abuse (HCC)        Past Surgical History:   Procedure Laterality Date   • APPENDECTOMY     • BREAST REDUCTION SURGERY Bilateral    • CHOLECYSTECTOMY         Social History     Tobacco Use   • Smoking status: Never     Passive exposure: Never   • Smokeless tobacco: Never   • Tobacco comments:     None personally, but both parents were heavy smokers throughout my life.   Substance Use Topics   • Alcohol use: Yes     Comment: drinks once every few months        Family History   Problem Relation Age of Onset   • Mental Illness Mother    • Depression Mother    • Mental Illness Father    • Alcohol Abuse Father    • Depression Father    • Mental Illness Brother    • Colon Cancer Maternal Grandmother         diagnosed 60s   • No Known

## 2025-03-27 ENCOUNTER — OFFICE VISIT (OUTPATIENT)
Dept: OBGYN CLINIC | Age: 45
End: 2025-03-27
Payer: COMMERCIAL

## 2025-03-27 VITALS
HEIGHT: 62 IN | WEIGHT: 182 LBS | BODY MASS INDEX: 33.49 KG/M2 | DIASTOLIC BLOOD PRESSURE: 76 MMHG | SYSTOLIC BLOOD PRESSURE: 112 MMHG

## 2025-03-27 DIAGNOSIS — Z72.0 VAPES NICOTINE CONTAINING SUBSTANCE: ICD-10-CM

## 2025-03-27 DIAGNOSIS — A53.9 SYPHILIS: ICD-10-CM

## 2025-03-27 DIAGNOSIS — N90.89 VULVAR LESION: Primary | ICD-10-CM

## 2025-03-27 PROCEDURE — 99459 PELVIC EXAMINATION: CPT | Performed by: OBSTETRICS & GYNECOLOGY

## 2025-03-27 PROCEDURE — 99406 BEHAV CHNG SMOKING 3-10 MIN: CPT | Performed by: OBSTETRICS & GYNECOLOGY

## 2025-03-27 PROCEDURE — 99204 OFFICE O/P NEW MOD 45 MIN: CPT | Performed by: OBSTETRICS & GYNECOLOGY

## 2025-03-27 NOTE — ASSESSMENT & PLAN NOTE
All previous notes, labs and/or imaging performed by PCP were reviewed and confirmed with pt today.  I interpreted these results and D/W pt my opinion and recommendations accordingly.   (+) treatment by HD. Will check titers today

## 2025-03-27 NOTE — ASSESSMENT & PLAN NOTE
D/W pt at length neg effects of tobacco abuse, vaping including but not limited to: death, multiple forms of CA, COPD, CAD, vascular damage, etc.  Encouraged cessation and D/W her methods (tapering, counseling, nicotine patches/gums, etc.)  Approximately 4 minutes spent in face to face counseling

## 2025-03-27 NOTE — PROGRESS NOTES
Pt comes in today as new pt for vulvar cyst. Pt states her partner also noticed cysts inside the vagina. Denies pain, abnormal discharge, burning and itching. Pt states she has had the cyst for 9 months.     LAST PAP:  01/30/2023 negative     LAST MAMMO:  05/01/2023     LMP:  Patient's last menstrual period was 03/20/2025 (exact date).    BIRTH CONTROL:  tubal     TOBACCO USE:  No , pt does vape    FAMILY HISTORY OF:   Breast Cancer:  No   Ovarian Cancer:  No   Uterine Cancer:  No   Colon Cancer:  Yes    Vitals:    03/27/25 1132   BP: 112/76   BP Site: Left Upper Arm   Patient Position: Sitting   Weight: 82.6 kg (182 lb)   Height: 1.575 m (5' 2\")        Cary Lawton MA  03/27/25  11:41 AM    
accordingly.   (+) treatment by HD. Will check titers today         Relevant Orders    T Pallidum Screen W/Reflex    Vulvar lesion - Primary    Small (3-4 mm) inclusion cyst in right labia.  Reassured pt nml findings  Poss treat with warm soaks, sitz baths, no true intervention indicated in light of asymptomatic         Vapes nicotine containing substance    D/W pt at length neg effects of tobacco abuse, vaping including but not limited to: death, multiple forms of CA, COPD, CAD, vascular damage, etc.  Encouraged cessation and D/W her methods (tapering, counseling, nicotine patches/gums, etc.)  Approximately 4 minutes spent in face to face counseling              Subjective     LAST PAP:  2023 negative      LAST MAMMO:  2023      LMP:  Patient's last menstrual period was 2025 (exact date).     BIRTH CONTROL:  tubal      TOBACCO USE:  No , pt does vape    Nga Gould 45 y.o.  presents today for vulvar lesion.  Pt states she first noticed this several months ago.  Denies any pain, D/C, bleeding from the lesion. Denies any vaginal D/C, pelvic pain, non-menstrual pelvic pain, F/C, assoc GI/ issues. Denies any neuro changes, visual changes, H/A, CP, SOB.          OB History    Para Term  AB Living   4 3 3  1 3   SAB IAB Ectopic Molar Multiple Live Births   1     3      # Outcome Date GA Lbr Rad/2nd Weight Sex Type Anes PTL Lv   4 SAB            3 Term      Vag-Spont   WILNER   2 Term      Vag-Spont   WILNER   1 Term      CS-Unspec   WILNER           Past Medical History:   Diagnosis Date    ADHD (attention deficit hyperactivity disorder)     Anxiety     Depression     Hypothyroidism     PTSD (post-traumatic stress disorder)     STD (sexually transmitted disease) 2023    syphilis - treated    Substance abuse (HCC)             Past Surgical History:   Procedure Laterality Date    APPENDECTOMY      BREAST REDUCTION SURGERY Bilateral      SECTION  2015    CHOLECYSTECTOMY

## 2025-03-27 NOTE — PATIENT INSTRUCTIONS
STOP VAPING!  Please call [phone] to schedule your mammogram  Follow up as needed or next January for your yearly female exam.  Thanks for coming to see us today and letting us take care of you!

## 2025-03-27 NOTE — ASSESSMENT & PLAN NOTE
Small (3-4 mm) inclusion cyst in right labia.  Reassured pt nml findings  Poss treat with warm soaks, sitz baths, no true intervention indicated in light of asymptomatic